# Patient Record
Sex: MALE | Race: WHITE | NOT HISPANIC OR LATINO | Employment: FULL TIME | URBAN - METROPOLITAN AREA
[De-identification: names, ages, dates, MRNs, and addresses within clinical notes are randomized per-mention and may not be internally consistent; named-entity substitution may affect disease eponyms.]

---

## 2019-12-09 ASSESSMENT — ENCOUNTER SYMPTOMS
SHORTNESS OF BREATH: 0
COUGH: 0
DIARRHEA: 0
PALPITATIONS: 0
HEMATURIA: 0
NAUSEA: 0
CHILLS: 0
HEARTBURN: 0
HEADACHES: 0
MYALGIAS: 0
NERVOUS/ANXIOUS: 0
ABDOMINAL PAIN: 0
CONSTIPATION: 0
ARTHRALGIAS: 0
DIZZINESS: 0
HEMATOCHEZIA: 0
WEAKNESS: 0
FREQUENCY: 0
EYE PAIN: 0
SORE THROAT: 0
JOINT SWELLING: 0
FEVER: 0
PARESTHESIAS: 0
DYSURIA: 0

## 2019-12-10 NOTE — PROGRESS NOTES
SUBJECTIVE:   CC: Darell Collazo is an 24 year old male who presents for preventative health visit.     Healthy Habits:    Getting at least 3 servings of Calcium per day:  Yes    Bi-annual eye exam:  Yes    Dental care twice a year:  NO    Sleep apnea or symptoms of sleep apnea:  None    Diet:  Regular (no restrictions)    Frequency of exercise:  2-3 days/week    Duration of exercise:  15-30 minutes    Taking medications regularly:  Not Applicable    Barriers to taking medications:  Not applicable    Medication side effects:  Not applicable    PHQ-2 Total Score:    Additional concerns today:  Yes              Today's PHQ-2 Score:   PHQ-2 ( 1999 Pfizer) 12/9/2019   Q1: Little interest or pleasure in doing things 0   Q2: Feeling down, depressed or hopeless 0   PHQ-2 Score 0   Q1: Little interest or pleasure in doing things Not at all   Q2: Feeling down, depressed or hopeless Not at all   PHQ-2 Score 0       Abuse: Current or Past(Physical, Sexual or Emotional)- No  Do you feel safe in your environment? Yes        Social History     Tobacco Use     Smoking status: Never Smoker     Smokeless tobacco: Never Used   Substance Use Topics     Alcohol use: No         Alcohol Use 12/9/2019   Prescreen: >3 drinks/day or >7 drinks/week? No         Reviewed orders with patient. Reviewed health maintenance and updated orders accordingly - Yes      Reviewed and updated as needed this visit by clinical staff  Tobacco  Allergies  Meds  Problems  Med Hx  Surg Hx  Fam Hx  Soc Hx          Reviewed and updated as needed this visit by Provider  Tobacco  Allergies  Meds  Problems  Med Hx  Surg Hx  Fam Hx            Review of Systems   Constitutional: Negative for chills and fever.   HENT: Negative for congestion, ear pain, hearing loss and sore throat.    Eyes: Negative for pain and visual disturbance.   Respiratory: Negative for cough and shortness of breath.    Cardiovascular: Negative for chest pain, palpitations and  "peripheral edema.   Gastrointestinal: Negative for abdominal pain, constipation, diarrhea, heartburn, hematochezia and nausea.   Genitourinary: Negative for discharge, dysuria, frequency, genital sores, hematuria, impotence and urgency.   Musculoskeletal: Negative for arthralgias, joint swelling and myalgias.   Skin: Negative for rash.   Neurological: Negative for dizziness, weakness, headaches and paresthesias.   Psychiatric/Behavioral: Negative for mood changes. The patient is not nervous/anxious.          OBJECTIVE:   /62   Pulse 74   Temp 98  F (36.7  C) (Temporal)   Resp 18   Ht 1.676 m (5' 6\")   Wt 77.6 kg (171 lb)   SpO2 98%   BMI 27.60 kg/m      Physical Exam  Constitutional:       General: He is not in acute distress.     Appearance: He is well-developed.   HENT:      Head: Normocephalic and atraumatic.      Right Ear: Hearing, tympanic membrane, ear canal and external ear normal. There is impacted cerumen.      Left Ear: Hearing, tympanic membrane, ear canal and external ear normal. There is impacted cerumen.      Ears:      Comments: Bilateral ear canal obstructed with cerumen.  This was removed by nursing with tap water irrigation.  Canal reexamined using otoscope and TM was within normal limits.        Nose: Nose normal.      Mouth/Throat:      Mouth: No oral lesions.      Pharynx: Uvula midline. No oropharyngeal exudate.   Eyes:      General: Lids are normal. No scleral icterus.        Right eye: No discharge.         Left eye: No discharge.      Conjunctiva/sclera: Conjunctivae normal.      Pupils: Pupils are equal, round, and reactive to light.   Neck:      Musculoskeletal: Normal range of motion and neck supple.      Thyroid: No thyroid mass or thyromegaly.      Trachea: No tracheal deviation.   Cardiovascular:      Rate and Rhythm: Normal rate and regular rhythm.      Pulses: Normal pulses.      Heart sounds: Normal heart sounds, S1 normal and S2 normal. No murmur. No S3 or S4 sounds.  " "  Pulmonary:      Effort: Pulmonary effort is normal. No respiratory distress.      Breath sounds: Normal breath sounds. No wheezing or rales.   Abdominal:      General: Bowel sounds are normal. There is no distension.      Palpations: Abdomen is soft. There is no mass.      Tenderness: There is no abdominal tenderness. There is no guarding.   Musculoskeletal: Normal range of motion.         General: No deformity.   Lymphadenopathy:      Cervical: No cervical adenopathy.      Upper Body:      Right upper body: No supraclavicular adenopathy.      Left upper body: No supraclavicular adenopathy.   Skin:     General: Skin is warm and dry.      Findings: No lesion or rash.   Neurological:      Mental Status: He is alert and oriented to person, place, and time.      Motor: No abnormal muscle tone.      Deep Tendon Reflexes: Reflexes are normal and symmetric.   Psychiatric:         Speech: Speech normal.         Thought Content: Thought content normal.         Judgment: Judgment normal.               ASSESSMENT/PLAN:     ASSESSMENT/ORDERS:    ICD-10-CM    1. Routine general medical examination at a health care facility Z00.00 Lipid panel reflex to direct LDL Fasting     PLAN:  1.  Discussed vaccine catch up today.  Will do MMR and varicella and Td and Hep A today.  2.  In 1 month or greater will have him come back for MMR and Varicella #2 and Hep B #2  3.  6 months from now, will get Td #3 and Hep B #3 and Hep A #2 (needs to be at least 2 months after round 2 noted above).    COUNSELING:   Reviewed preventive health counseling, as reflected in patient instructions    Estimated body mass index is 27.6 kg/m  as calculated from the following:    Height as of this encounter: 1.676 m (5' 6\").    Weight as of this encounter: 77.6 kg (171 lb).     Weight management plan: Discussed healthy diet and exercise guidelines     reports that he has never smoked. He has never used smokeless tobacco.      Counseling Resources:  ATP IV " Guidelines  Pooled Cohorts Equation Calculator  FRAX Risk Assessment  ICSI Preventive Guidelines  Dietary Guidelines for Americans, 2010  USDA's MyPlate  ASA Prophylaxis  Lung CA Screening    Gal Taveras MD  [facility]

## 2019-12-23 ENCOUNTER — OFFICE VISIT (OUTPATIENT)
Dept: FAMILY MEDICINE | Facility: CLINIC | Age: 24
End: 2019-12-23
Payer: COMMERCIAL

## 2019-12-23 VITALS
DIASTOLIC BLOOD PRESSURE: 62 MMHG | WEIGHT: 171 LBS | OXYGEN SATURATION: 98 % | TEMPERATURE: 98 F | HEART RATE: 74 BPM | RESPIRATION RATE: 18 BRPM | HEIGHT: 66 IN | SYSTOLIC BLOOD PRESSURE: 118 MMHG | BODY MASS INDEX: 27.48 KG/M2

## 2019-12-23 DIAGNOSIS — H61.23 BILATERAL IMPACTED CERUMEN: ICD-10-CM

## 2019-12-23 DIAGNOSIS — Z00.00 ROUTINE GENERAL MEDICAL EXAMINATION AT A HEALTH CARE FACILITY: Primary | ICD-10-CM

## 2019-12-23 PROCEDURE — 90472 IMMUNIZATION ADMIN EACH ADD: CPT | Performed by: FAMILY MEDICINE

## 2019-12-23 PROCEDURE — 99395 PREV VISIT EST AGE 18-39: CPT | Mod: 25 | Performed by: FAMILY MEDICINE

## 2019-12-23 PROCEDURE — 90632 HEPA VACCINE ADULT IM: CPT | Performed by: FAMILY MEDICINE

## 2019-12-23 PROCEDURE — 69209 REMOVE IMPACTED EAR WAX UNI: CPT | Mod: 50 | Performed by: FAMILY MEDICINE

## 2019-12-23 PROCEDURE — 90714 TD VACC NO PRESV 7 YRS+ IM: CPT | Performed by: FAMILY MEDICINE

## 2019-12-23 PROCEDURE — 90471 IMMUNIZATION ADMIN: CPT | Performed by: FAMILY MEDICINE

## 2019-12-23 PROCEDURE — 90716 VAR VACCINE LIVE SUBQ: CPT | Performed by: FAMILY MEDICINE

## 2019-12-23 PROCEDURE — 90707 MMR VACCINE SC: CPT | Performed by: FAMILY MEDICINE

## 2019-12-23 ASSESSMENT — PAIN SCALES - GENERAL: PAINLEVEL: NO PAIN (0)

## 2019-12-23 ASSESSMENT — MIFFLIN-ST. JEOR: SCORE: 1708.4

## 2019-12-23 NOTE — NURSING NOTE
Patient identified using two patient identifiers.  Ear exam showing wax occlusion completed by provider.  H202/H20 was placed in the bilateral ear(s) via irrigation tool: elephant ear.    Prior to immunization administration, verified patients identity using patient s name and date of birth. Please see Immunization Activity for additional information.     Screening Questionnaire for Adult Immunization    Are you sick today?   No   Do you have allergies to medications, food, a vaccine component or latex?   No   Have you ever had a serious reaction after receiving a vaccination?   No   Do you have a long-term health problem with heart, lung, kidney, or metabolic disease (e.g., diabetes), asthma, a blood disorder, no spleen, complement component deficiency, a cochlear implant, or a spinal fluid leak?  Are you on long-term aspirin therapy?   No   Do you have cancer, leukemia, HIV/AIDS, or any other immune system problem?   No   Do you have a parent, brother, or sister with an immune system problem?   No   In the past 3 months, have you taken medications that affect  your immune system, such as prednisone, other steroids, or anticancer drugs; drugs for the treatment of rheumatoid arthritis, Crohn s disease, or psoriasis; or have you had radiation treatments?   No   Have you had a seizure, or a brain or other nervous system problem?   No   During the past year, have you received a transfusion of blood or blood    products, or been given immune (gamma) globulin or antiviral drug?   No   For women: Are you pregnant or is there a chance you could become       pregnant during the next month?   No   Have you received any vaccinations in the past 4 weeks?   No     Immunization questionnaire answers were all negative.        Per orders of Dr. Taveras, injection of MMR, Varicella, Hep A, TD. given by Carissa Barrios CMA. Patient instructed to remain in clinic for 15 minutes afterwards, and to report any adverse reaction to me  immediately.       Screening performed by Carissa Barrios CMA on 12/23/2019 at 10:26 AM.

## 2020-01-27 ENCOUNTER — ALLIED HEALTH/NURSE VISIT (OUTPATIENT)
Dept: FAMILY MEDICINE | Facility: CLINIC | Age: 25
End: 2020-01-27
Payer: COMMERCIAL

## 2020-01-27 DIAGNOSIS — Z23 NEED FOR VACCINATION: Primary | ICD-10-CM

## 2020-01-27 PROCEDURE — 90472 IMMUNIZATION ADMIN EACH ADD: CPT

## 2020-01-27 PROCEDURE — 90707 MMR VACCINE SC: CPT

## 2020-01-27 PROCEDURE — 90471 IMMUNIZATION ADMIN: CPT

## 2020-01-27 PROCEDURE — 99207 ZZC NO CHARGE NURSE ONLY: CPT

## 2020-01-27 PROCEDURE — 90716 VAR VACCINE LIVE SUBQ: CPT

## 2020-01-27 PROCEDURE — 90746 HEPB VACCINE 3 DOSE ADULT IM: CPT

## 2020-12-20 ENCOUNTER — HEALTH MAINTENANCE LETTER (OUTPATIENT)
Age: 25
End: 2020-12-20

## 2021-02-28 ENCOUNTER — HEALTH MAINTENANCE LETTER (OUTPATIENT)
Age: 26
End: 2021-02-28

## 2021-08-10 ENCOUNTER — OFFICE VISIT (OUTPATIENT)
Dept: FAMILY MEDICINE | Facility: CLINIC | Age: 26
End: 2021-08-10
Payer: COMMERCIAL

## 2021-08-10 VITALS
RESPIRATION RATE: 16 BRPM | HEIGHT: 68 IN | BODY MASS INDEX: 24.86 KG/M2 | SYSTOLIC BLOOD PRESSURE: 124 MMHG | DIASTOLIC BLOOD PRESSURE: 70 MMHG | WEIGHT: 164 LBS | OXYGEN SATURATION: 98 % | TEMPERATURE: 98 F | HEART RATE: 80 BPM

## 2021-08-10 DIAGNOSIS — Z30.09 ENCOUNTER FOR VASECTOMY COUNSELING: Primary | ICD-10-CM

## 2021-08-10 PROCEDURE — 99213 OFFICE O/P EST LOW 20 MIN: CPT | Performed by: FAMILY MEDICINE

## 2021-08-10 RX ORDER — DIAZEPAM 5 MG
TABLET ORAL
Qty: 2 TABLET | Refills: 0 | Status: SHIPPED | OUTPATIENT
Start: 2021-08-10 | End: 2021-08-10

## 2021-08-10 RX ORDER — DIAZEPAM 5 MG
TABLET ORAL
Qty: 2 TABLET | Refills: 0 | Status: SHIPPED | OUTPATIENT
Start: 2021-08-10 | End: 2022-09-29

## 2021-08-10 ASSESSMENT — MIFFLIN-ST. JEOR: SCORE: 1692.65

## 2021-08-10 ASSESSMENT — PAIN SCALES - GENERAL: PAINLEVEL: NO PAIN (0)

## 2021-08-10 NOTE — PROGRESS NOTES
"SUBJECTIVE:  This 26 year old male is in for a vasectomy consultation.  He and his partner have decided they don't want to have any more children. They have decided that he will have the sterilization procedure instead of her.  He understands that this is a permanent procedure.  Reversal could be an option, but has a less than 25% success rate.  Patient was given information to read prior to the consultation.      We talked about the risks and benefits of the vasectomy; risks being that of potential for bleeding, infection, postoperative discomfort immediately which can last for a number of weeks afterwards, also the development of spermatoceles or sperm granulomas, epididymal cysts and the possibility of failure of the procedure producing failed attempt at sterility.     Also mentioned to the patient that there is some literature out there that suggests men who have vasectomies are at increased risk for prostate cancer; however, this literature is inconclusive and controversial.  It is recommended that men who have vasectomies should discuss appropriate prostate cancer screening with their regular provider.     I went through the procedure with the patient, how it is done, a midline incision in the scrotum measuring approximately 1/2 inch in length.  The vas deferens is brought up through this incision, dissected free and cut in two.  Each open end is cauterized and then the proximal or distal end is buried away from the other.  Patient had no questions when it came to the procedure itself.  I did show him pictures and diagrams of the procedure.  I showed him where a potential spermatocele or sperm granuloma can occur.      Again, the patient had no further questions for me.    OBJECTIVE:  /70   Pulse 80   Temp 98  F (36.7  C) (Temporal)   Resp 16   Ht 1.718 m (5' 7.64\")   Wt 74.4 kg (164 lb)   SpO2 98%   BMI 25.20 kg/m    On exam, this is a well-developed, well-nourished white male.    Exam reveals a " normal circumcised penis, no lesions.  Testes are descended bilaterally.  Exam was limited to the genitalia.  Both vas deferens were easily identified.  No other abnormalities were noted.      ASSESSMENT:  Undesired fertility in an adult male, requesting vasectomy.    PLAN:  He will schedule a vasectomy at his convenience for either the last appointment of the morning or on a Thursday or Friday afternoon.  He is aware that he will need to take the entire weekend off and have essentially bedrest for two days with ice packs every two hours and ibuprofen for discomfort.  I offeredhim a prescription for ibuprofen 800 mg to take every eight hours with food after the procedure, but he declined the prescription as he prefers to use the OTC equivalent.  I gave him a prescription for Valium two 5 mg tablets.  He will take 1 tablet upon arrival to clinic and will have the second tablet available to take as needed.  He understands that he will need a  after the procedure due to taking this type of medication.    If he has any further questions, he will contact me prior to the procedure or ask me on the day of the procedure.  He also is aware that he will need to bring a specimen after 12 weeks to make sure that he has cleared the storage vesicles of any residual sperm and to make sure that he is sterile.  He understands that until this is done, he and his partner should continue their regular method of contraception.  I also recommended that he submit a repeat specimen 1 year after the procedure to document continued sterility.      Gal Taveras MD

## 2021-09-10 ENCOUNTER — OFFICE VISIT (OUTPATIENT)
Dept: FAMILY MEDICINE | Facility: CLINIC | Age: 26
End: 2021-09-10
Payer: COMMERCIAL

## 2021-09-10 ENCOUNTER — APPOINTMENT (OUTPATIENT)
Dept: FAMILY MEDICINE | Facility: CLINIC | Age: 26
End: 2021-09-10
Payer: COMMERCIAL

## 2021-09-10 DIAGNOSIS — Z30.2 ENCOUNTER FOR VASECTOMY: Primary | ICD-10-CM

## 2021-09-10 PROCEDURE — 55250 REMOVAL OF SPERM DUCT(S): CPT | Performed by: FAMILY MEDICINE

## 2021-09-10 NOTE — PROGRESS NOTES
SUBJECTIVE:  Darell Collazo is a 26 year old male who comes in today for vasectomy.  The patient (and his significant other) have previously been in and we discussed the other options for birth control, the risks and benefits of the procedure.  Details of those risks and benefits have been noted on the consent form which has been signed. Prostate cancer data was also reviewed.  Patient took 10 mg of valium prior to procedure.  All questions have been answered.    Procedure:  In the supine position,the patient was sterilely prepped and draped in the usual fashion.  The left vas was first identified and brought up to the midline raphae where a small wheal of Lidocaine with epinephrine was placed in the skin overlying the vas and further anesthesia injected in to the vas sheath.  The no-scalpel vas clamp was then used to grasp the vas and the no-scalpel dissecting instrument was then used to puncture the skin and dissect out the vas free of adventitial tissue.  Skin bleeders were cauterized with thermocautery as necessary.  When a segment of vas was clearly freed up, the vas clamped and ligated with both open open ends cauterized directly inside the lumen.  The distal end was then buried with pursestring suture.  Any bleeders were then cauterized and/or ligated with 4-0 Vicryl.  When the sterile field was completely dry, it was returned to the scrotal sac.  The same procedure was done on the right side. Bacitracin dressing was placed over open wound.      ASSESSMENT:    Male sterilization via vasectomy without complication.    PLAN:   Post vas instruction sheet is reviewed and given along with a specimen container.  Patient instructed to rest over the next couple of days, apply ice and use 600-800mg of ibuprofen tid.  Patient is not considered sterile until the post vas specimen verifies aspermia.  One year followup also recommended.  To call for any problems as outlined and activity as outlined in the sheet  given.    Gal Taveras MD

## 2021-10-03 ENCOUNTER — HEALTH MAINTENANCE LETTER (OUTPATIENT)
Age: 26
End: 2021-10-03

## 2021-12-14 ENCOUNTER — LAB (OUTPATIENT)
Dept: LAB | Facility: CLINIC | Age: 26
End: 2021-12-14
Payer: COMMERCIAL

## 2021-12-14 DIAGNOSIS — Z30.2 ENCOUNTER FOR VASECTOMY: ICD-10-CM

## 2021-12-14 LAB — SPERM P VAS SMN QL MICRO: NORMAL

## 2021-12-14 PROCEDURE — 89321 SEMEN ANAL SPERM DETECTION: CPT

## 2021-12-14 NOTE — RESULT ENCOUNTER NOTE
Da,  Your results show you had a successful vasectomy.  I recommend follow-up specimen in 1 year.  Please let me know if you have any questions.    Sincerely,  Dr. Taveras

## 2022-03-19 ENCOUNTER — HEALTH MAINTENANCE LETTER (OUTPATIENT)
Age: 27
End: 2022-03-19

## 2022-09-04 ENCOUNTER — HEALTH MAINTENANCE LETTER (OUTPATIENT)
Age: 27
End: 2022-09-04

## 2022-09-29 ENCOUNTER — OFFICE VISIT (OUTPATIENT)
Dept: FAMILY MEDICINE | Facility: CLINIC | Age: 27
End: 2022-09-29
Payer: COMMERCIAL

## 2022-09-29 VITALS
TEMPERATURE: 98.4 F | BODY MASS INDEX: 26.87 KG/M2 | RESPIRATION RATE: 14 BRPM | SYSTOLIC BLOOD PRESSURE: 108 MMHG | WEIGHT: 171.2 LBS | HEART RATE: 76 BPM | DIASTOLIC BLOOD PRESSURE: 72 MMHG | OXYGEN SATURATION: 100 % | HEIGHT: 67 IN

## 2022-09-29 DIAGNOSIS — H61.23 BILATERAL IMPACTED CERUMEN: ICD-10-CM

## 2022-09-29 DIAGNOSIS — Z00.00 ROUTINE GENERAL MEDICAL EXAMINATION AT A HEALTH CARE FACILITY: Primary | ICD-10-CM

## 2022-09-29 DIAGNOSIS — Z11.59 NEED FOR HEPATITIS C SCREENING TEST: ICD-10-CM

## 2022-09-29 DIAGNOSIS — Z13.220 SCREENING FOR HYPERLIPIDEMIA: ICD-10-CM

## 2022-09-29 DIAGNOSIS — Z13.1 SCREENING FOR DIABETES MELLITUS: ICD-10-CM

## 2022-09-29 PROCEDURE — 69209 REMOVE IMPACTED EAR WAX UNI: CPT | Mod: 50 | Performed by: STUDENT IN AN ORGANIZED HEALTH CARE EDUCATION/TRAINING PROGRAM

## 2022-09-29 PROCEDURE — 90471 IMMUNIZATION ADMIN: CPT | Performed by: STUDENT IN AN ORGANIZED HEALTH CARE EDUCATION/TRAINING PROGRAM

## 2022-09-29 PROCEDURE — 90686 IIV4 VACC NO PRSV 0.5 ML IM: CPT | Performed by: STUDENT IN AN ORGANIZED HEALTH CARE EDUCATION/TRAINING PROGRAM

## 2022-09-29 PROCEDURE — 99395 PREV VISIT EST AGE 18-39: CPT | Mod: 25 | Performed by: STUDENT IN AN ORGANIZED HEALTH CARE EDUCATION/TRAINING PROGRAM

## 2022-09-29 ASSESSMENT — ENCOUNTER SYMPTOMS
DIARRHEA: 0
SORE THROAT: 0
HEADACHES: 0
JOINT SWELLING: 0
SHORTNESS OF BREATH: 0
NAUSEA: 0
EYE PAIN: 0
ARTHRALGIAS: 0
WEAKNESS: 0
HEMATURIA: 0
HEARTBURN: 0
HEMATOCHEZIA: 0
NERVOUS/ANXIOUS: 0
PARESTHESIAS: 0
MYALGIAS: 0
COUGH: 0
DIZZINESS: 0
DYSURIA: 0
CHILLS: 0
PALPITATIONS: 0
ABDOMINAL PAIN: 0
CONSTIPATION: 0
FREQUENCY: 0
FEVER: 0

## 2022-09-29 ASSESSMENT — PAIN SCALES - GENERAL: PAINLEVEL: NO PAIN (0)
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
## 2023-05-01 ENCOUNTER — APPOINTMENT (OUTPATIENT)
Dept: CT IMAGING | Facility: CLINIC | Age: 28
End: 2023-05-01
Payer: COMMERCIAL

## 2023-05-01 ENCOUNTER — ANESTHESIA EVENT (OUTPATIENT)
Dept: SURGERY | Facility: CLINIC | Age: 28
End: 2023-05-01
Payer: COMMERCIAL

## 2023-05-01 ENCOUNTER — HOSPITAL ENCOUNTER (INPATIENT)
Facility: CLINIC | Age: 28
LOS: 2 days | Discharge: HOME | End: 2023-05-03
Attending: EMERGENCY MEDICINE | Admitting: INTERNAL MEDICINE
Payer: COMMERCIAL

## 2023-05-01 ENCOUNTER — HOSPITAL ENCOUNTER (INPATIENT)
Facility: CLINIC | Age: 28
Setting detail: SURGERY ADMIT
End: 2023-05-01
Attending: SURGERY | Admitting: SURGERY
Payer: COMMERCIAL

## 2023-05-01 ENCOUNTER — ANESTHESIA (OUTPATIENT)
Dept: SURGERY | Facility: CLINIC | Age: 28
End: 2023-05-01
Payer: COMMERCIAL

## 2023-05-01 DIAGNOSIS — K35.32 ACUTE APPENDICITIS WITH PERFORATION AND LOCALIZED PERITONITIS, WITHOUT ABSCESS, UNSPECIFIED WHETHER GANGRENE PRESENT: Primary | ICD-10-CM

## 2023-05-01 LAB
ALBUMIN SERPL BCG-MCNC: 4.4 G/DL (ref 3.5–5.2)
ALP SERPL-CCNC: 91 U/L (ref 40–129)
ALT SERPL W P-5'-P-CCNC: 17 U/L (ref 10–50)
ANION GAP SERPL CALCULATED.3IONS-SCNC: 20 MMOL/L (ref 7–15)
AST SERPL W P-5'-P-CCNC: 19 U/L (ref 10–50)
BASOPHILS # BLD AUTO: 0.1 10E3/UL (ref 0–0.2)
BASOPHILS NFR BLD AUTO: 0 %
BILIRUB DIRECT SERPL-MCNC: 0.36 MG/DL (ref 0–0.3)
BILIRUB SERPL-MCNC: 1.8 MG/DL
BUN SERPL-MCNC: 11.4 MG/DL (ref 6–20)
CALCIUM SERPL-MCNC: 9.3 MG/DL (ref 8.6–10)
CHLORIDE SERPL-SCNC: 96 MMOL/L (ref 98–107)
CREAT SERPL-MCNC: 0.96 MG/DL (ref 0.67–1.17)
CRP SERPL-MCNC: 118.32 MG/L
DEPRECATED HCO3 PLAS-SCNC: 19 MMOL/L (ref 22–29)
EOSINOPHIL # BLD AUTO: 0.1 10E3/UL (ref 0–0.7)
EOSINOPHIL NFR BLD AUTO: 0 %
ERYTHROCYTE [DISTWIDTH] IN BLOOD BY AUTOMATED COUNT: 17.9 % (ref 10–15)
ERYTHROCYTE [SEDIMENTATION RATE] IN BLOOD BY WESTERGREN METHOD: 18 MM/HR (ref 0–15)
GFR SERPL CREATININE-BSD FRML MDRD: >90 ML/MIN/1.73M2
GLUCOSE SERPL-MCNC: 124 MG/DL (ref 70–99)
HCT VFR BLD AUTO: 40.4 % (ref 40–53)
HGB BLD-MCNC: 12.9 G/DL (ref 13.3–17.7)
HOLD SPECIMEN: NORMAL
HOLD SPECIMEN: NORMAL
IMM GRANULOCYTES # BLD: 0.1 10E3/UL
IMM GRANULOCYTES NFR BLD: 1 %
LIPASE SERPL-CCNC: 24 U/L (ref 13–60)
LYMPHOCYTES # BLD AUTO: 1.7 10E3/UL (ref 0.8–5.3)
LYMPHOCYTES NFR BLD AUTO: 12 %
MCH RBC QN AUTO: 19.3 PG (ref 26.5–33)
MCHC RBC AUTO-ENTMCNC: 31.9 G/DL (ref 31.5–36.5)
MCV RBC AUTO: 60 FL (ref 78–100)
MONOCYTES # BLD AUTO: 1.2 10E3/UL (ref 0–1.3)
MONOCYTES NFR BLD AUTO: 9 %
NEUTROPHILS # BLD AUTO: 11.1 10E3/UL (ref 1.6–8.3)
NEUTROPHILS NFR BLD AUTO: 78 %
NRBC # BLD AUTO: 0 10E3/UL
NRBC BLD AUTO-RTO: 0 /100
PLATELET # BLD AUTO: 238 10E3/UL (ref 150–450)
POTASSIUM SERPL-SCNC: 3.6 MMOL/L (ref 3.4–5.3)
PROT SERPL-MCNC: 7.9 G/DL (ref 6.4–8.3)
RBC # BLD AUTO: 6.7 10E6/UL (ref 4.4–5.9)
SODIUM SERPL-SCNC: 135 MMOL/L (ref 136–145)
WBC # BLD AUTO: 14.2 10E3/UL (ref 4–11)

## 2023-05-01 PROCEDURE — 271N000001 HC OR GENERAL SUPPLY NON-STERILE: Performed by: SURGERY

## 2023-05-01 PROCEDURE — 88304 TISSUE EXAM BY PATHOLOGIST: CPT | Mod: 26 | Performed by: PATHOLOGY

## 2023-05-01 PROCEDURE — 99221 1ST HOSP IP/OBS SF/LOW 40: CPT | Mod: 57 | Performed by: SURGERY

## 2023-05-01 PROCEDURE — 250N000009 HC RX 250: Performed by: NURSE ANESTHETIST, CERTIFIED REGISTERED

## 2023-05-01 PROCEDURE — 250N000011 HC RX IP 250 OP 636: Performed by: EMERGENCY MEDICINE

## 2023-05-01 PROCEDURE — 80053 COMPREHEN METABOLIC PANEL: CPT

## 2023-05-01 PROCEDURE — 96376 TX/PRO/DX INJ SAME DRUG ADON: CPT | Performed by: EMERGENCY MEDICINE

## 2023-05-01 PROCEDURE — 99285 EMERGENCY DEPT VISIT HI MDM: CPT | Mod: 25 | Performed by: EMERGENCY MEDICINE

## 2023-05-01 PROCEDURE — 258N000003 HC RX IP 258 OP 636: Performed by: INTERNAL MEDICINE

## 2023-05-01 PROCEDURE — 360N000076 HC SURGERY LEVEL 3, PER MIN: Performed by: SURGERY

## 2023-05-01 PROCEDURE — 99285 EMERGENCY DEPT VISIT HI MDM: CPT | Performed by: EMERGENCY MEDICINE

## 2023-05-01 PROCEDURE — 44970 LAPAROSCOPY APPENDECTOMY: CPT | Performed by: SURGERY

## 2023-05-01 PROCEDURE — 88304 TISSUE EXAM BY PATHOLOGIST: CPT | Mod: TC | Performed by: SURGERY

## 2023-05-01 PROCEDURE — 250N000009 HC RX 250: Performed by: SURGERY

## 2023-05-01 PROCEDURE — 83690 ASSAY OF LIPASE: CPT

## 2023-05-01 PROCEDURE — 96361 HYDRATE IV INFUSION ADD-ON: CPT | Performed by: EMERGENCY MEDICINE

## 2023-05-01 PROCEDURE — 82248 BILIRUBIN DIRECT: CPT | Performed by: EMERGENCY MEDICINE

## 2023-05-01 PROCEDURE — 86140 C-REACTIVE PROTEIN: CPT | Performed by: EMERGENCY MEDICINE

## 2023-05-01 PROCEDURE — 370N000017 HC ANESTHESIA TECHNICAL FEE, PER MIN: Performed by: SURGERY

## 2023-05-01 PROCEDURE — 0DTJ4ZZ RESECTION OF APPENDIX, PERCUTANEOUS ENDOSCOPIC APPROACH: ICD-10-PCS | Performed by: SURGERY

## 2023-05-01 PROCEDURE — 250N000009 HC RX 250: Performed by: EMERGENCY MEDICINE

## 2023-05-01 PROCEDURE — 258N000003 HC RX IP 258 OP 636

## 2023-05-01 PROCEDURE — 250N000011 HC RX IP 250 OP 636: Performed by: SURGERY

## 2023-05-01 PROCEDURE — 258N000003 HC RX IP 258 OP 636: Performed by: NURSE ANESTHETIST, CERTIFIED REGISTERED

## 2023-05-01 PROCEDURE — 0DBH4ZZ EXCISION OF CECUM, PERCUTANEOUS ENDOSCOPIC APPROACH: ICD-10-PCS | Performed by: SURGERY

## 2023-05-01 PROCEDURE — 250N000011 HC RX IP 250 OP 636: Performed by: NURSE ANESTHETIST, CERTIFIED REGISTERED

## 2023-05-01 PROCEDURE — 96374 THER/PROPH/DIAG INJ IV PUSH: CPT | Mod: 59 | Performed by: EMERGENCY MEDICINE

## 2023-05-01 PROCEDURE — 250N000013 HC RX MED GY IP 250 OP 250 PS 637: Performed by: NURSE ANESTHETIST, CERTIFIED REGISTERED

## 2023-05-01 PROCEDURE — 250N000011 HC RX IP 250 OP 636

## 2023-05-01 PROCEDURE — 120N000001 HC R&B MED SURG/OB

## 2023-05-01 PROCEDURE — 74177 CT ABD & PELVIS W/CONTRAST: CPT

## 2023-05-01 PROCEDURE — 85652 RBC SED RATE AUTOMATED: CPT | Performed by: EMERGENCY MEDICINE

## 2023-05-01 PROCEDURE — 710N000010 HC RECOVERY PHASE 1, LEVEL 2, PER MIN: Performed by: SURGERY

## 2023-05-01 PROCEDURE — 36415 COLL VENOUS BLD VENIPUNCTURE: CPT

## 2023-05-01 PROCEDURE — 85025 COMPLETE CBC W/AUTO DIFF WBC: CPT

## 2023-05-01 PROCEDURE — 272N000001 HC OR GENERAL SUPPLY STERILE: Performed by: SURGERY

## 2023-05-01 PROCEDURE — 250N000026 HC DESFLURANE, PER MIN: Performed by: SURGERY

## 2023-05-01 RX ORDER — BUPIVACAINE HYDROCHLORIDE AND EPINEPHRINE 2.5; 5 MG/ML; UG/ML
INJECTION, SOLUTION INFILTRATION; PERINEURAL PRN
Status: DISCONTINUED | OUTPATIENT
Start: 2023-05-01 | End: 2023-05-01 | Stop reason: HOSPADM

## 2023-05-01 RX ORDER — ACETAMINOPHEN 325 MG/1
975 TABLET ORAL ONCE
Status: COMPLETED | OUTPATIENT
Start: 2023-05-01 | End: 2023-05-01

## 2023-05-01 RX ORDER — HYDROMORPHONE HYDROCHLORIDE 1 MG/ML
0.4 INJECTION, SOLUTION INTRAMUSCULAR; INTRAVENOUS; SUBCUTANEOUS EVERY 5 MIN PRN
Status: DISCONTINUED | OUTPATIENT
Start: 2023-05-01 | End: 2023-05-01 | Stop reason: HOSPADM

## 2023-05-01 RX ORDER — PROPOFOL 10 MG/ML
INJECTION, EMULSION INTRAVENOUS PRN
Status: DISCONTINUED | OUTPATIENT
Start: 2023-05-01 | End: 2023-05-01

## 2023-05-01 RX ORDER — NALOXONE HYDROCHLORIDE 0.4 MG/ML
0.2 INJECTION, SOLUTION INTRAMUSCULAR; INTRAVENOUS; SUBCUTANEOUS
Status: DISCONTINUED | OUTPATIENT
Start: 2023-05-01 | End: 2023-05-03 | Stop reason: HOSPADM

## 2023-05-01 RX ORDER — AMPICILLIN AND SULBACTAM 2; 1 G/1; G/1
3 INJECTION, POWDER, FOR SOLUTION INTRAMUSCULAR; INTRAVENOUS EVERY 6 HOURS
Status: DISCONTINUED | OUTPATIENT
Start: 2023-05-02 | End: 2023-05-03

## 2023-05-01 RX ORDER — NALOXONE HYDROCHLORIDE 0.4 MG/ML
0.4 INJECTION, SOLUTION INTRAMUSCULAR; INTRAVENOUS; SUBCUTANEOUS
Status: DISCONTINUED | OUTPATIENT
Start: 2023-05-01 | End: 2023-05-03 | Stop reason: HOSPADM

## 2023-05-01 RX ORDER — HYDROCODONE BITARTRATE AND ACETAMINOPHEN 5; 325 MG/1; MG/1
1 TABLET ORAL EVERY 6 HOURS PRN
Status: DISCONTINUED | OUTPATIENT
Start: 2023-05-01 | End: 2023-05-03 | Stop reason: HOSPADM

## 2023-05-01 RX ORDER — FENTANYL CITRATE 50 UG/ML
25 INJECTION, SOLUTION INTRAMUSCULAR; INTRAVENOUS EVERY 5 MIN PRN
Status: DISCONTINUED | OUTPATIENT
Start: 2023-05-01 | End: 2023-05-01 | Stop reason: HOSPADM

## 2023-05-01 RX ORDER — ACETAMINOPHEN 325 MG/1
650 TABLET ORAL EVERY 6 HOURS PRN
Status: DISCONTINUED | OUTPATIENT
Start: 2023-05-01 | End: 2023-05-03

## 2023-05-01 RX ORDER — KETAMINE HYDROCHLORIDE 10 MG/ML
INJECTION INTRAMUSCULAR; INTRAVENOUS PRN
Status: DISCONTINUED | OUTPATIENT
Start: 2023-05-01 | End: 2023-05-01

## 2023-05-01 RX ORDER — CEFAZOLIN SODIUM/WATER 2 G/20 ML
2 SYRINGE (ML) INTRAVENOUS SEE ADMIN INSTRUCTIONS
Status: CANCELLED | OUTPATIENT
Start: 2023-05-01

## 2023-05-01 RX ORDER — AMPICILLIN AND SULBACTAM 2; 1 G/1; G/1
3 INJECTION, POWDER, FOR SOLUTION INTRAMUSCULAR; INTRAVENOUS ONCE
Status: COMPLETED | OUTPATIENT
Start: 2023-05-01 | End: 2023-05-01

## 2023-05-01 RX ORDER — ENOXAPARIN SODIUM 100 MG/ML
40 INJECTION SUBCUTANEOUS EVERY 24 HOURS
Status: DISCONTINUED | OUTPATIENT
Start: 2023-05-02 | End: 2023-05-02

## 2023-05-01 RX ORDER — CEFAZOLIN SODIUM/WATER 2 G/20 ML
2 SYRINGE (ML) INTRAVENOUS
Status: CANCELLED | OUTPATIENT
Start: 2023-05-01

## 2023-05-01 RX ORDER — DEXAMETHASONE SODIUM PHOSPHATE 10 MG/ML
INJECTION, SOLUTION INTRAMUSCULAR; INTRAVENOUS PRN
Status: DISCONTINUED | OUTPATIENT
Start: 2023-05-01 | End: 2023-05-01

## 2023-05-01 RX ORDER — HYDROXYZINE HYDROCHLORIDE 25 MG/1
25 TABLET, FILM COATED ORAL EVERY 6 HOURS PRN
Status: DISCONTINUED | OUTPATIENT
Start: 2023-05-01 | End: 2023-05-01 | Stop reason: HOSPADM

## 2023-05-01 RX ORDER — SODIUM CHLORIDE, SODIUM LACTATE, POTASSIUM CHLORIDE, CALCIUM CHLORIDE 600; 310; 30; 20 MG/100ML; MG/100ML; MG/100ML; MG/100ML
INJECTION, SOLUTION INTRAVENOUS CONTINUOUS PRN
Status: DISCONTINUED | OUTPATIENT
Start: 2023-05-01 | End: 2023-05-01

## 2023-05-01 RX ORDER — FENTANYL CITRATE 50 UG/ML
50 INJECTION, SOLUTION INTRAMUSCULAR; INTRAVENOUS EVERY 5 MIN PRN
Status: DISCONTINUED | OUTPATIENT
Start: 2023-05-01 | End: 2023-05-01 | Stop reason: HOSPADM

## 2023-05-01 RX ORDER — ONDANSETRON 2 MG/ML
4 INJECTION INTRAMUSCULAR; INTRAVENOUS EVERY 30 MIN PRN
Status: DISCONTINUED | OUTPATIENT
Start: 2023-05-01 | End: 2023-05-01 | Stop reason: HOSPADM

## 2023-05-01 RX ORDER — ONDANSETRON 2 MG/ML
INJECTION INTRAMUSCULAR; INTRAVENOUS PRN
Status: DISCONTINUED | OUTPATIENT
Start: 2023-05-01 | End: 2023-05-01

## 2023-05-01 RX ORDER — LIDOCAINE HYDROCHLORIDE 10 MG/ML
INJECTION, SOLUTION INFILTRATION; PERINEURAL PRN
Status: DISCONTINUED | OUTPATIENT
Start: 2023-05-01 | End: 2023-05-01

## 2023-05-01 RX ORDER — FENTANYL CITRATE 50 UG/ML
INJECTION, SOLUTION INTRAMUSCULAR; INTRAVENOUS PRN
Status: DISCONTINUED | OUTPATIENT
Start: 2023-05-01 | End: 2023-05-01

## 2023-05-01 RX ORDER — HYDROMORPHONE HYDROCHLORIDE 1 MG/ML
0.2 INJECTION, SOLUTION INTRAMUSCULAR; INTRAVENOUS; SUBCUTANEOUS EVERY 5 MIN PRN
Status: DISCONTINUED | OUTPATIENT
Start: 2023-05-01 | End: 2023-05-01 | Stop reason: HOSPADM

## 2023-05-01 RX ORDER — SODIUM CHLORIDE 9 MG/ML
INJECTION, SOLUTION INTRAVENOUS CONTINUOUS
Status: DISCONTINUED | OUTPATIENT
Start: 2023-05-01 | End: 2023-05-03 | Stop reason: HOSPADM

## 2023-05-01 RX ORDER — KETOROLAC TROMETHAMINE 30 MG/ML
INJECTION, SOLUTION INTRAMUSCULAR; INTRAVENOUS PRN
Status: DISCONTINUED | OUTPATIENT
Start: 2023-05-01 | End: 2023-05-01

## 2023-05-01 RX ORDER — HALOPERIDOL 5 MG/ML
1 INJECTION INTRAMUSCULAR
Status: DISCONTINUED | OUTPATIENT
Start: 2023-05-01 | End: 2023-05-01 | Stop reason: HOSPADM

## 2023-05-01 RX ORDER — ONDANSETRON 2 MG/ML
4 INJECTION INTRAMUSCULAR; INTRAVENOUS EVERY 30 MIN PRN
Status: DISCONTINUED | OUTPATIENT
Start: 2023-05-01 | End: 2023-05-03

## 2023-05-01 RX ORDER — IOPAMIDOL 755 MG/ML
500 INJECTION, SOLUTION INTRAVASCULAR ONCE
Status: COMPLETED | OUTPATIENT
Start: 2023-05-01 | End: 2023-05-01

## 2023-05-01 RX ORDER — HYDROMORPHONE HYDROCHLORIDE 1 MG/ML
0.5 INJECTION, SOLUTION INTRAMUSCULAR; INTRAVENOUS; SUBCUTANEOUS
Status: DISCONTINUED | OUTPATIENT
Start: 2023-05-01 | End: 2023-05-03

## 2023-05-01 RX ORDER — SODIUM CHLORIDE, SODIUM LACTATE, POTASSIUM CHLORIDE, CALCIUM CHLORIDE 600; 310; 30; 20 MG/100ML; MG/100ML; MG/100ML; MG/100ML
INJECTION, SOLUTION INTRAVENOUS CONTINUOUS
Status: DISCONTINUED | OUTPATIENT
Start: 2023-05-01 | End: 2023-05-01 | Stop reason: HOSPADM

## 2023-05-01 RX ORDER — PROPOFOL 10 MG/ML
INJECTION, EMULSION INTRAVENOUS CONTINUOUS PRN
Status: DISCONTINUED | OUTPATIENT
Start: 2023-05-01 | End: 2023-05-01

## 2023-05-01 RX ORDER — ONDANSETRON 4 MG/1
4 TABLET, ORALLY DISINTEGRATING ORAL EVERY 30 MIN PRN
Status: DISCONTINUED | OUTPATIENT
Start: 2023-05-01 | End: 2023-05-01 | Stop reason: HOSPADM

## 2023-05-01 RX ORDER — SODIUM CHLORIDE 9 MG/ML
INJECTION, SOLUTION INTRAVENOUS CONTINUOUS
Status: DISCONTINUED | OUTPATIENT
Start: 2023-05-01 | End: 2023-05-01

## 2023-05-01 RX ADMIN — LIDOCAINE HYDROCHLORIDE 50 MG: 10 INJECTION, SOLUTION INFILTRATION; PERINEURAL at 19:40

## 2023-05-01 RX ADMIN — DEXMEDETOMIDINE HYDROCHLORIDE 10 MCG: 100 INJECTION, SOLUTION INTRAVENOUS at 20:03

## 2023-05-01 RX ADMIN — SODIUM CHLORIDE 60 ML: 9 INJECTION, SOLUTION INTRAVENOUS at 16:46

## 2023-05-01 RX ADMIN — ACETAMINOPHEN 975 MG: 325 TABLET ORAL at 21:37

## 2023-05-01 RX ADMIN — HYDROMORPHONE HYDROCHLORIDE 0.5 MG: 1 INJECTION, SOLUTION INTRAMUSCULAR; INTRAVENOUS; SUBCUTANEOUS at 18:12

## 2023-05-01 RX ADMIN — Medication 20 MG: at 19:59

## 2023-05-01 RX ADMIN — AMPICILLIN SODIUM AND SULBACTAM SODIUM 3 G: 2; 1 INJECTION, POWDER, FOR SOLUTION INTRAMUSCULAR; INTRAVENOUS at 17:39

## 2023-05-01 RX ADMIN — ROCURONIUM BROMIDE 50 MG: 50 INJECTION, SOLUTION INTRAVENOUS at 19:40

## 2023-05-01 RX ADMIN — FENTANYL CITRATE 50 MCG: 50 INJECTION, SOLUTION INTRAMUSCULAR; INTRAVENOUS at 20:11

## 2023-05-01 RX ADMIN — SODIUM CHLORIDE 1000 ML: 9 INJECTION, SOLUTION INTRAVENOUS at 15:50

## 2023-05-01 RX ADMIN — FENTANYL CITRATE 50 MCG: 50 INJECTION, SOLUTION INTRAMUSCULAR; INTRAVENOUS at 19:40

## 2023-05-01 RX ADMIN — HYDROMORPHONE HYDROCHLORIDE 0.5 MG: 1 INJECTION, SOLUTION INTRAMUSCULAR; INTRAVENOUS; SUBCUTANEOUS at 16:00

## 2023-05-01 RX ADMIN — IOPAMIDOL 80 ML: 755 INJECTION, SOLUTION INTRAVENOUS at 16:47

## 2023-05-01 RX ADMIN — DEXAMETHASONE SODIUM PHOSPHATE 10 MG: 10 INJECTION, SOLUTION INTRAMUSCULAR; INTRAVENOUS at 19:59

## 2023-05-01 RX ADMIN — SODIUM CHLORIDE, POTASSIUM CHLORIDE, SODIUM LACTATE AND CALCIUM CHLORIDE: 600; 310; 30; 20 INJECTION, SOLUTION INTRAVENOUS at 19:32

## 2023-05-01 RX ADMIN — SUGAMMADEX 200 MG: 100 INJECTION, SOLUTION INTRAVENOUS at 20:54

## 2023-05-01 RX ADMIN — PROPOFOL 200 MG: 10 INJECTION, EMULSION INTRAVENOUS at 19:40

## 2023-05-01 RX ADMIN — SODIUM CHLORIDE: 9 INJECTION, SOLUTION INTRAVENOUS at 23:19

## 2023-05-01 RX ADMIN — MIDAZOLAM 2 MG: 1 INJECTION INTRAMUSCULAR; INTRAVENOUS at 19:32

## 2023-05-01 RX ADMIN — Medication 1.5 EACH: at 19:32

## 2023-05-01 RX ADMIN — AMPICILLIN AND SULBACTAM 3 G: 2; 1 INJECTION, POWDER, FOR SOLUTION INTRAVENOUS at 23:26

## 2023-05-01 RX ADMIN — ROCURONIUM BROMIDE 20 MG: 50 INJECTION, SOLUTION INTRAVENOUS at 20:10

## 2023-05-01 RX ADMIN — KETOROLAC TROMETHAMINE 30 MG: 30 INJECTION, SOLUTION INTRAMUSCULAR at 20:45

## 2023-05-01 RX ADMIN — PROPOFOL 100 MCG/KG/MIN: 10 INJECTION, EMULSION INTRAVENOUS at 19:40

## 2023-05-01 RX ADMIN — ONDANSETRON 4 MG: 2 INJECTION INTRAMUSCULAR; INTRAVENOUS at 19:59

## 2023-05-01 ASSESSMENT — ACTIVITIES OF DAILY LIVING (ADL)
ADLS_ACUITY_SCORE: 35

## 2023-05-01 ASSESSMENT — ENCOUNTER SYMPTOMS
FEVER: 0
APPETITE CHANGE: 0
BLOOD IN STOOL: 0
COUGH: 0
VOMITING: 0
DIARRHEA: 1
DYSURIA: 0
HEMATURIA: 0
SHORTNESS OF BREATH: 0
NAUSEA: 0
CHILLS: 0
ABDOMINAL PAIN: 1

## 2023-05-01 NOTE — ED TRIAGE NOTES
C/o abd pain since Friday but today since 1430 is very severe lower abd and tingling in hands, pt is pale and feeling fatigued.      Triage Assessment     Row Name 05/01/23 1528       Triage Assessment (Adult)    Airway WDL WDL       Respiratory WDL    Respiratory WDL WDL       Cardiac WDL    Cardiac WDL WDL

## 2023-05-01 NOTE — ED PROVIDER NOTES
History     Chief Complaint   Patient presents with     Abdominal Pain     HPI  Darell Collazo is a 28 year old male who is otherwise healthy presents to the emergency department for evaluation of lower abdominal pain.  He has been experiencing intermittent nonbloody diarrhea about 1-2 weeks ago he had diarrhea 1-2 time per day for a week and a half. He did not have much pain then.  He has been good for the last week. He then developed some lower abdominal pain 3 days ago that became severe this afternoon.  The pain is localized to the right lower quadrant and suprapubic regions and does not radiate anywhere.  His pain is worsened with movement.  He denies known fevers, urinary symptoms, nausea, or vomiting.  He did notice some new tingling in bilateral fingers today.  He has been able to tolerate oral intake until today.  He has not had anything to eat or drink since last night at 6 pm.   He denies history of IBD or abdominal surgeries.  His wife did have similar symptoms last week.  No family history of inflammatory bowel disease.         Allergies:  No Known Allergies    Problem List:    There are no problems to display for this patient.       Past Medical History:    History reviewed. No pertinent past medical history.    Past Surgical History:    Past Surgical History:   Procedure Laterality Date     VASECTOMY N/A 09/10/2021       Family History:    History reviewed. No pertinent family history.    Social History:  Marital Status:   [2]  Social History     Tobacco Use     Smoking status: Never     Smokeless tobacco: Never   Vaping Use     Vaping status: Never Used     Passive vaping exposure: Yes   Substance Use Topics     Alcohol use: Yes     Drug use: No        Medications:    No current outpatient medications on file.        Review of Systems   Constitutional: Negative for appetite change, chills and fever.   Respiratory: Negative for cough and shortness of breath.    Cardiovascular: Negative for  chest pain.   Gastrointestinal: Positive for abdominal pain and diarrhea. Negative for blood in stool, nausea and vomiting.   Genitourinary: Negative for dysuria and hematuria.   All other systems reviewed and are negative.      Physical Exam   BP: 100/43  Pulse: 77  Temp: 99.1  F (37.3  C)  Resp: 18  Weight: 74.8 kg (165 lb)  SpO2: 100 %      Physical Exam  Vitals and nursing note reviewed.   Constitutional:       Appearance: He is ill-appearing.   HENT:      Mouth/Throat:      Mouth: Mucous membranes are dry.   Cardiovascular:      Rate and Rhythm: Normal rate and regular rhythm.   Pulmonary:      Effort: Pulmonary effort is normal.      Breath sounds: Normal breath sounds.   Abdominal:      General: Bowel sounds are normal.      Tenderness: There is abdominal tenderness in the right lower quadrant and suprapubic area. There is guarding. There is no rebound.   Skin:     Coloration: Skin is pale.   Neurological:      Mental Status: He is alert.         ED Course                 Procedures                Results for orders placed or performed during the hospital encounter of 05/01/23 (from the past 24 hour(s))   [place] Draw    Narrative    The following orders were created for panel order [place] Draw.  Procedure                               Abnormality         Status                     ---------                               -----------         ------                     Extra Green Top (Lithium...[[id number]]                      Final result               Extra Purple Top Tube[[id number]]                            Final result                 Please view results for these tests on the individual orders.   Extra Green Top (Lithium Heparin) Tube   Result Value Ref Range    Hold Specimen JIC    Extra Purple Top Tube   Result Value Ref Range    Hold Specimen JI    Comprehensive metabolic panel   Result Value Ref Range    Sodium 135 (L) 136 - 145 mmol/L    Potassium 3.6 3.4 - 5.3 mmol/L    Chloride 96 (L) 98 - 107  mmol/L    Carbon Dioxide (CO2) 19 (L) 22 - 29 mmol/L    Anion Gap 20 (H) 7 - 15 mmol/L    Urea Nitrogen 11.4 6.0 - 20.0 mg/dL    Creatinine 0.96 0.67 - 1.17 mg/dL    Calcium 9.3 8.6 - 10.0 mg/dL    Glucose 124 (H) 70 - 99 mg/dL    Alkaline Phosphatase 91 40 - 129 U/L    AST 19 10 - 50 U/L    ALT 17 10 - 50 U/L    Protein Total 7.9 6.4 - 8.3 g/dL    Albumin 4.4 3.5 - 5.2 g/dL    Bilirubin Total 1.8 (H) <=1.2 mg/dL    GFR Estimate >90 >60 mL/min/1.73m2   CBC with platelets differential    Narrative    The following orders were created for panel order CBC with platelets differential.  Procedure                               Abnormality         Status                     ---------                               -----------         ------                     CBC with platelets and d...[[id number]]  Abnormal            Final result                 Please view results for these tests on the individual orders.   Lipase   Result Value Ref Range    Lipase 24 13 - 60 U/L   CBC with platelets and differential   Result Value Ref Range    WBC Count 14.2 (H) 4.0 - 11.0 10e3/uL    RBC Count 6.70 (H) 4.40 - 5.90 10e6/uL    Hemoglobin 12.9 (L) 13.3 - 17.7 g/dL    Hematocrit 40.4 40.0 - 53.0 %    MCV 60 (L) 78 - 100 fL    MCH 19.3 (L) 26.5 - 33.0 pg    MCHC 31.9 31.5 - 36.5 g/dL    RDW 17.9 (H) 10.0 - 15.0 %    Platelet Count 238 150 - 450 10e3/uL    % Neutrophils 78 %    % Lymphocytes 12 %    % Monocytes 9 %    % Eosinophils 0 %    % Basophils 0 %    % Immature Granulocytes 1 %    NRBCs per 100 WBC 0 <1 /100    Absolute Neutrophils 11.1 (H) 1.6 - 8.3 10e3/uL    Absolute Lymphocytes 1.7 0.8 - 5.3 10e3/uL    Absolute Monocytes 1.2 0.0 - 1.3 10e3/uL    Absolute Eosinophils 0.1 0.0 - 0.7 10e3/uL    Absolute Basophils 0.1 0.0 - 0.2 10e3/uL    Absolute Immature Granulocytes 0.1 <=0.4 10e3/uL    Absolute NRBCs 0.0 10e3/uL   Erythrocyte sedimentation rate auto   Result Value Ref Range    Erythrocyte Sedimentation Rate 18 (H) 0 - 15 mm/hr   CRP  inflammation   Result Value Ref Range    CRP Inflammation 118.32 (H) <5.00 mg/L   Bilirubin direct   Result Value Ref Range    Bilirubin Direct 0.36 (H) 0.00 - 0.30 mg/dL   CT Abdomen Pelvis w Contrast    Narrative    EXAM: CT ABDOMEN PELVIS W CONTRAST  LOCATION: [facility]  DATE/TIME: 5/1/2023 4:52 PM CDT    INDICATION: RLQ abdominal pain with diarrhea  COMPARISON: None.  TECHNIQUE: CT scan of the abdomen and pelvis was performed following injection of IV contrast. Multiplanar reformats were obtained. Dose reduction techniques were used.  CONTRAST: Isovue 370, 81 mL    FINDINGS:   LOWER CHEST: Normal.    HEPATOBILIARY: Normal.    PANCREAS: Normal.    SPLEEN: Normal.    ADRENAL GLANDS: Normal.    KIDNEYS/BLADDER: Normal.    BOWEL: 1 cm appendicolith present at base of the appendix. The appendix is fluid-filled, dilated and inflamed consistent with appendicitis. There is prominent surrounding soft tissue stranding especially involving the appendiceal base and cecal tip.   There is questionable loss of integrity of the appendiceal wall near the appendicolith and a mild amount of free fluid, early perforation can't be excluded. There is mild wall thickening and enhancement involving numerous loops of adjacent small bowel.   Trace fluid fluid seen within each gutter as well as free fluid in the pelvis but no localized abscess. No free air evident. No bowel obstruction.    LYMPH NODES: Mild reactive adenopathy in the ileocolic distribution.    VASCULATURE: Unremarkable.    PELVIC ORGANS: Mild free fluid.    MUSCULOSKELETAL: Normal.      Impression    IMPRESSION:   1.  Complicated appendicitis. 1 cm obstructing appendicolith with loss of integrity of the appendiceal wall near the base and concern for early perforation.  2.  Abundant surrounding soft tissue stranding, cecal and adjacent small bowel wall thickening and a mild amount of free ascites.       Medications   0.9% sodium chloride  BOLUS (0 mLs Intravenous Stopped 5/1/23 1732)     Followed by   sodium chloride 0.9% infusion (has no administration in time range)   HYDROmorphone (PF) (DILAUDID) injection 0.5 mg (0.5 mg Intravenous $Given 5/1/23 1600)   ondansetron (ZOFRAN) injection 4 mg (has no administration in time range)   0.9% sodium chloride BOLUS (has no administration in time range)     Followed by   sodium chloride 0.9% infusion (has no administration in time range)   ampicillin-sulbactam (UNASYN) 3 g vial to attach to  mL bag (3 g Intravenous $New Bag 5/1/23 1739)   iopamidol (ISOVUE-370) solution 500 mL (80 mLs Intravenous $Given 5/1/23 1647)   sodium chloride 0.9 % bag 100mL for CT scan flush use (60 mLs Intravenous $Given 5/1/23 1646)       Assessments & Plan (with Medical Decision Making)  28 yr old with severe rlq abdominal pain, anorexia, nausea, vomiting but no fever.  No family history of inflammatory bowel disease.  He did have an episode of loose stools for about a week and ended about a week and a half ago.  No surgical history of the abdomen.  He takes no medications.  He is not blood thinners.  He is appropriately NPO.  I reviewed the labs with the patient including a leukocytosis, elevated bilirubin with mild elevation of direct bilirubin.  AST and ALT are normal.  CT scan shows acute appendicitis with possible perforation and appendicolith with inflammatory findings surrounding it. No tachycardia.  sbp near 100 which it was at his last office visit.   Patient given a dose of Unasyn.  Discussed the case with  he would like to see the patient to the operating room at 7 PM and agrees with the antibiotic choice and n.p.o.  He thinks the patient most likely will need to stay in the hospital overnight.  We are checking to see if there is any bed availability here.  Patient is feeling much better after the Dilaudid.  He did vomit up all of the water that we attempted to give him for CT scan.  We were able to  confirm bed availability here and therefore the patient will be taken to the operating room.  He is medically cleared for surgery.   D/w Dr. Taveras.  Night hospitalist will do an H and P after the surgery.  Agrees with the admission.       I have reviewed the nursing notes.    I have reviewed the findings, diagnosis, plan and need for follow up with the patient.           Medical Decision Making  The patient's presentation was of moderate complexity (an acute illness with systemic symptoms).    The patient's evaluation involved:  ordering and/or review of 3+ test(s) in this encounter (see separate area of note for details)    The patient's management necessitated high risk (a decision regarding hospitalization).        New Prescriptions    No medications on file       Final diagnoses:   Acute appendicitis with perforation and localized peritonitis, without abscess, unspecified whether gangrene present       5/1/2023   [facility] EMERGENCY DEPT     Saeid He MD  05/01/23 2102       Saeid He MD  05/01/23 1752       Saeid He MD  05/01/23 5326

## 2023-05-02 ENCOUNTER — TELEPHONE (OUTPATIENT)
Dept: SURGERY | Facility: CLINIC | Age: 28
End: 2023-05-02
Payer: COMMERCIAL

## 2023-05-02 PROBLEM — K35.32 ACUTE APPENDICITIS WITH PERFORATION AND LOCALIZED PERITONITIS, WITHOUT ABSCESS, UNSPECIFIED WHETHER GANGRENE PRESENT: Status: ACTIVE | Noted: 2023-05-02

## 2023-05-02 LAB
ALBUMIN SERPL BCG-MCNC: 3.6 G/DL (ref 3.5–5.2)
ALP SERPL-CCNC: 70 U/L (ref 40–129)
ALT SERPL W P-5'-P-CCNC: 13 U/L (ref 10–50)
ANION GAP SERPL CALCULATED.3IONS-SCNC: 10 MMOL/L (ref 7–15)
AST SERPL W P-5'-P-CCNC: 15 U/L (ref 10–50)
BILIRUB SERPL-MCNC: 1.3 MG/DL
BUN SERPL-MCNC: 12.1 MG/DL (ref 6–20)
CALCIUM SERPL-MCNC: 8.7 MG/DL (ref 8.6–10)
CHLORIDE SERPL-SCNC: 103 MMOL/L (ref 98–107)
CREAT SERPL-MCNC: 0.81 MG/DL (ref 0.67–1.17)
DEPRECATED HCO3 PLAS-SCNC: 25 MMOL/L (ref 22–29)
ERYTHROCYTE [DISTWIDTH] IN BLOOD BY AUTOMATED COUNT: 17.3 % (ref 10–15)
GFR SERPL CREATININE-BSD FRML MDRD: >90 ML/MIN/1.73M2
GLUCOSE SERPL-MCNC: 157 MG/DL (ref 70–99)
HCT VFR BLD AUTO: 37.7 % (ref 40–53)
HGB BLD-MCNC: 11.7 G/DL (ref 13.3–17.7)
MCH RBC QN AUTO: 18.9 PG (ref 26.5–33)
MCHC RBC AUTO-ENTMCNC: 31 G/DL (ref 31.5–36.5)
MCV RBC AUTO: 61 FL (ref 78–100)
PLATELET # BLD AUTO: 217 10E3/UL (ref 150–450)
POTASSIUM SERPL-SCNC: 4.5 MMOL/L (ref 3.4–5.3)
PROT SERPL-MCNC: 6.8 G/DL (ref 6.4–8.3)
RBC # BLD AUTO: 6.18 10E6/UL (ref 4.4–5.9)
SODIUM SERPL-SCNC: 138 MMOL/L (ref 136–145)
WBC # BLD AUTO: 12 10E3/UL (ref 4–11)

## 2023-05-02 PROCEDURE — 258N000003 HC RX IP 258 OP 636: Performed by: INTERNAL MEDICINE

## 2023-05-02 PROCEDURE — 250N000013 HC RX MED GY IP 250 OP 250 PS 637: Performed by: INTERNAL MEDICINE

## 2023-05-02 PROCEDURE — 120N000001 HC R&B MED SURG/OB

## 2023-05-02 PROCEDURE — 250N000011 HC RX IP 250 OP 636: Performed by: SURGERY

## 2023-05-02 PROCEDURE — 80053 COMPREHEN METABOLIC PANEL: CPT | Performed by: SURGERY

## 2023-05-02 PROCEDURE — 85027 COMPLETE CBC AUTOMATED: CPT | Performed by: INTERNAL MEDICINE

## 2023-05-02 PROCEDURE — 99232 SBSQ HOSP IP/OBS MODERATE 35: CPT | Performed by: NURSE PRACTITIONER

## 2023-05-02 PROCEDURE — 36415 COLL VENOUS BLD VENIPUNCTURE: CPT | Performed by: SURGERY

## 2023-05-02 PROCEDURE — 250N000013 HC RX MED GY IP 250 OP 250 PS 637: Performed by: SURGERY

## 2023-05-02 RX ORDER — LIDOCAINE 40 MG/G
CREAM TOPICAL
Status: DISCONTINUED | OUTPATIENT
Start: 2023-05-02 | End: 2023-05-03 | Stop reason: HOSPADM

## 2023-05-02 RX ORDER — KETOROLAC TROMETHAMINE 15 MG/ML
15 INJECTION, SOLUTION INTRAMUSCULAR; INTRAVENOUS EVERY 6 HOURS
Status: COMPLETED | OUTPATIENT
Start: 2023-05-02 | End: 2023-05-03

## 2023-05-02 RX ORDER — PROCHLORPERAZINE MALEATE 5 MG
10 TABLET ORAL EVERY 6 HOURS PRN
Status: DISCONTINUED | OUTPATIENT
Start: 2023-05-02 | End: 2023-05-03 | Stop reason: HOSPADM

## 2023-05-02 RX ORDER — HYDROMORPHONE HCL IN WATER/PF 6 MG/30 ML
0.2 PATIENT CONTROLLED ANALGESIA SYRINGE INTRAVENOUS
Status: DISCONTINUED | OUTPATIENT
Start: 2023-05-02 | End: 2023-05-03 | Stop reason: HOSPADM

## 2023-05-02 RX ORDER — OXYCODONE HYDROCHLORIDE 5 MG/1
10 TABLET ORAL EVERY 4 HOURS PRN
Status: DISCONTINUED | OUTPATIENT
Start: 2023-05-02 | End: 2023-05-03 | Stop reason: HOSPADM

## 2023-05-02 RX ORDER — POLYETHYLENE GLYCOL 3350 17 G/17G
17 POWDER, FOR SOLUTION ORAL DAILY
Status: DISCONTINUED | OUTPATIENT
Start: 2023-05-02 | End: 2023-05-03 | Stop reason: HOSPADM

## 2023-05-02 RX ORDER — OXYCODONE HYDROCHLORIDE 5 MG/1
5 TABLET ORAL EVERY 4 HOURS PRN
Status: DISCONTINUED | OUTPATIENT
Start: 2023-05-02 | End: 2023-05-03 | Stop reason: HOSPADM

## 2023-05-02 RX ORDER — ACETAMINOPHEN 325 MG/1
650 TABLET ORAL EVERY 4 HOURS PRN
Status: DISCONTINUED | OUTPATIENT
Start: 2023-05-04 | End: 2023-05-03 | Stop reason: HOSPADM

## 2023-05-02 RX ORDER — ENOXAPARIN SODIUM 100 MG/ML
40 INJECTION SUBCUTANEOUS EVERY 24 HOURS
Status: DISCONTINUED | OUTPATIENT
Start: 2023-05-02 | End: 2023-05-02

## 2023-05-02 RX ORDER — AMOXICILLIN 250 MG
1 CAPSULE ORAL 2 TIMES DAILY
Status: DISCONTINUED | OUTPATIENT
Start: 2023-05-02 | End: 2023-05-03 | Stop reason: HOSPADM

## 2023-05-02 RX ORDER — ACETAMINOPHEN 325 MG/1
975 TABLET ORAL EVERY 8 HOURS
Status: DISCONTINUED | OUTPATIENT
Start: 2023-05-02 | End: 2023-05-03 | Stop reason: HOSPADM

## 2023-05-02 RX ORDER — ONDANSETRON 4 MG/1
4 TABLET, ORALLY DISINTEGRATING ORAL EVERY 6 HOURS PRN
Status: DISCONTINUED | OUTPATIENT
Start: 2023-05-02 | End: 2023-05-03 | Stop reason: HOSPADM

## 2023-05-02 RX ORDER — ONDANSETRON 2 MG/ML
4 INJECTION INTRAMUSCULAR; INTRAVENOUS EVERY 6 HOURS PRN
Status: DISCONTINUED | OUTPATIENT
Start: 2023-05-02 | End: 2023-05-03 | Stop reason: HOSPADM

## 2023-05-02 RX ORDER — BISACODYL 10 MG
10 SUPPOSITORY, RECTAL RECTAL DAILY PRN
Status: DISCONTINUED | OUTPATIENT
Start: 2023-05-02 | End: 2023-05-03 | Stop reason: HOSPADM

## 2023-05-02 RX ORDER — HYDROMORPHONE HCL IN WATER/PF 6 MG/30 ML
0.4 PATIENT CONTROLLED ANALGESIA SYRINGE INTRAVENOUS
Status: DISCONTINUED | OUTPATIENT
Start: 2023-05-02 | End: 2023-05-03 | Stop reason: HOSPADM

## 2023-05-02 RX ADMIN — SENNOSIDES AND DOCUSATE SODIUM 1 TABLET: 8.6; 5 TABLET ORAL at 08:28

## 2023-05-02 RX ADMIN — AMPICILLIN AND SULBACTAM 3 G: 2; 1 INJECTION, POWDER, FOR SOLUTION INTRAVENOUS at 23:25

## 2023-05-02 RX ADMIN — KETOROLAC TROMETHAMINE 15 MG: 15 INJECTION, SOLUTION INTRAMUSCULAR; INTRAVENOUS at 20:11

## 2023-05-02 RX ADMIN — KETOROLAC TROMETHAMINE 15 MG: 15 INJECTION, SOLUTION INTRAMUSCULAR; INTRAVENOUS at 14:20

## 2023-05-02 RX ADMIN — ACETAMINOPHEN 975 MG: 325 TABLET ORAL at 13:03

## 2023-05-02 RX ADMIN — ACETAMINOPHEN 650 MG: 325 TABLET ORAL at 04:07

## 2023-05-02 RX ADMIN — OXYCODONE HYDROCHLORIDE 5 MG: 5 TABLET ORAL at 18:36

## 2023-05-02 RX ADMIN — AMPICILLIN AND SULBACTAM 3 G: 2; 1 INJECTION, POWDER, FOR SOLUTION INTRAVENOUS at 05:42

## 2023-05-02 RX ADMIN — SODIUM CHLORIDE: 9 INJECTION, SOLUTION INTRAVENOUS at 14:20

## 2023-05-02 RX ADMIN — SODIUM CHLORIDE: 9 INJECTION, SOLUTION INTRAVENOUS at 20:22

## 2023-05-02 RX ADMIN — POLYETHYLENE GLYCOL 3350 17 G: 17 POWDER, FOR SOLUTION ORAL at 08:29

## 2023-05-02 RX ADMIN — ACETAMINOPHEN 975 MG: 325 TABLET ORAL at 20:10

## 2023-05-02 RX ADMIN — KETOROLAC TROMETHAMINE 15 MG: 15 INJECTION, SOLUTION INTRAMUSCULAR; INTRAVENOUS at 08:29

## 2023-05-02 RX ADMIN — AMPICILLIN AND SULBACTAM 3 G: 2; 1 INJECTION, POWDER, FOR SOLUTION INTRAVENOUS at 13:03

## 2023-05-02 RX ADMIN — AMPICILLIN AND SULBACTAM 3 G: 2; 1 INJECTION, POWDER, FOR SOLUTION INTRAVENOUS at 18:18

## 2023-05-02 ASSESSMENT — ACTIVITIES OF DAILY LIVING (ADL)
ADLS_ACUITY_SCORE: 18
ADLS_ACUITY_SCORE: 35
ADLS_ACUITY_SCORE: 18
ADLS_ACUITY_SCORE: 35
ADLS_ACUITY_SCORE: 18

## 2023-05-02 NOTE — PLAN OF CARE
Goal Outcome Evaluation:      Plan of Care Reviewed With: patient    Overall Patient Progress: improvingOverall Patient Progress: improving    Outcome Evaluation: Vss. Pain to abdomen rated 1/10, prn tylenol given. Bowel sounds are hypoactive, abdomen slight tender, soft. Pt ambulated to bathroom to void. Receiving IV fluids and iV abx.

## 2023-05-02 NOTE — ANESTHESIA POSTPROCEDURE EVALUATION
Patient: Darell Collazo    Procedure: Procedure(s):  APPENDECTOMY and Partial Cecectomy, LAPAROSCOPIC       Anesthesia Type:  General    Note:  Disposition: Outpatient   Postop Pain Control: Uneventful            Sign Out: Well controlled pain   PONV: No   Neuro/Psych: Uneventful            Sign Out: Acceptable/Baseline neuro status   Airway/Respiratory: Uneventful            Sign Out: Acceptable/Baseline resp. status   CV/Hemodynamics: Uneventful            Sign Out: Acceptable CV status   Other NRE: NONE   DID A NON-ROUTINE EVENT OCCUR? No    Event details/Postop Comments:  Pt was happy with anesthesia care.  No complications.  I will follow up with the pt if needed.           Last vitals:  Vitals Value Taken Time   /74 05/01/23 2201   Temp 99.2  F (37.3  C) 05/01/23 2200   Pulse 90 05/01/23 2203   Resp 18 05/01/23 2203   SpO2 95 % 05/01/23 2204   Vitals shown include unvalidated device data.    Electronically Signed By: ENRRIQUE Purdy CRNA  May 2, 2023  1:29 PM

## 2023-05-02 NOTE — PROGRESS NOTES
Pt arrived to med/surg Room 273 from PACU. Pt is alert, oriented x4. Rates pain to abdomen 1/10, declines pain medications at this time. Bowel sounds are hypoactive. Laparoscopic sites to abdomen x3. Vss.

## 2023-05-02 NOTE — ANESTHESIA CARE TRANSFER NOTE
Patient: Darell Collazo    Procedure: Procedure(s):  APPENDECTOMY and Partial Cecectomy, LAPAROSCOPIC       Diagnosis: Acute appendicitis with perforation and localized peritonitis, without abscess, unspecified whether gangrene present [K35.32]  Diagnosis Additional Information: No value filed.    Anesthesia Type:   General     Note:    Oropharynx: oropharynx clear of all foreign objects and spontaneously breathing  Level of Consciousness: drowsy  Oxygen Supplementation: face mask    Independent Airway: airway patency satisfactory and stable  Dentition: dentition unchanged  Vital Signs Stable: post-procedure vital signs reviewed and stable  Report to RN Given: handoff report given  Patient transferred to: PACU    Handoff Report: Identifed the Patient, Identified the Reponsible Provider, Reviewed the pertinent medical history, Discussed the surgical course, Reviewed Intra-OP anesthesia mangement and issues during anesthesia, Set expectations for post-procedure period and Allowed opportunity for questions and acknowledgement of understanding      Vitals:  Vitals Value Taken Time   /71 05/01/23 2116   Temp     Pulse 110 05/01/23 2117   Resp 25 05/01/23 2117   SpO2     Vitals shown include unvalidated device data.    Electronically Signed By: ENRRIQUE Reno CRNA  May 1, 2023  9:18 PM

## 2023-05-02 NOTE — ANESTHESIA PREPROCEDURE EVALUATION
Anesthesia Pre-Procedure Evaluation    Patient: Darell Collazo   MRN: [id number] : 1995        Procedure : Procedure(s):  APPENDECTOMY, LAPAROSCOPIC          History reviewed. No pertinent past medical history.   Past Surgical History:   Procedure Laterality Date     VASECTOMY N/A 09/10/2021      No Known Allergies   Social History     Tobacco Use     Smoking status: Never     Smokeless tobacco: Never   Vaping Use     Vaping status: Never Used     Passive vaping exposure: Yes   Substance Use Topics     Alcohol use: Yes      Wt Readings from Last 1 Encounters:   23 74.8 kg (165 lb)        Anesthesia Evaluation   Pt has had prior anesthetic. Type: MAC and General.    No history of anesthetic complications       ROS/MED HX  ENT/Pulmonary:  - neg pulmonary ROS     Neurologic:  - neg neurologic ROS     Cardiovascular:  - neg cardiovascular ROS     METS/Exercise Tolerance: >4 METS    Hematologic:  - neg hematologic  ROS     Musculoskeletal:  - neg musculoskeletal ROS     GI/Hepatic:  - neg GI/hepatic ROS     Renal/Genitourinary:  - neg Renal ROS     Endo:  - neg endo ROS     Psychiatric/Substance Use:  - neg psychiatric ROS     Infectious Disease:  - neg infectious disease ROS     Malignancy:  - neg malignancy ROS     Other:  - neg other ROS          Physical Exam    Airway  airway exam normal      Mallampati: II   TM distance: > 3 FB   Neck ROM: full   Mouth opening: > 3 cm    Respiratory Devices and Support         Dental           Cardiovascular   cardiovascular exam normal       Rhythm and rate: regular and normal     Pulmonary   pulmonary exam normal        breath sounds clear to auscultation           OUTSIDE LABS:  CBC:   Lab Results   Component Value Date    WBC 14.2 (H) 2023    HGB 12.9 (L) 2023    HCT 40.4 2023     2023     BMP:   Lab Results   Component Value Date     (L) 2023    POTASSIUM 3.6 2023    CHLORIDE 96 (L) 2023    CO2 19 (L)  05/01/2023    BUN 11.4 05/01/2023    CR 0.96 05/01/2023     (H) 05/01/2023     COAGS: No results found for: PTT, INR, FIBR  POC: No results found for: BGM, HCG, HCGS  HEPATIC:   Lab Results   Component Value Date    ALBUMIN 4.4 05/01/2023    PROTTOTAL 7.9 05/01/2023    ALT 17 05/01/2023    AST 19 05/01/2023    ALKPHOS 91 05/01/2023    BILITOTAL 1.8 (H) 05/01/2023     OTHER:   Lab Results   Component Value Date    GRACE 9.3 05/01/2023    LIPASE 24 05/01/2023    SED 18 (H) 05/01/2023       Anesthesia Plan    ASA Status:  1, emergent    NPO Status:  NPO Appropriate    Anesthesia Type: General.     - Airway: ETT   Induction: Intravenous.   Maintenance: Balanced.        Consents    Anesthesia Plan(s) and associated risks, benefits, and realistic alternatives discussed. Questions answered and patient/representative(s) expressed understanding.     - Discussed: Risks, Benefits and Alternatives for BOTH SEDATION and the PROCEDURE were discussed     - Discussed with:  Patient      - Extended Intubation/Ventilatory Support Discussed: No.      - Patient is DNR/DNI Status: No    Use of blood products discussed: No .     Postoperative Care    Pain management: IV analgesics, Multi-modal analgesia.   PONV prophylaxis: Ondansetron (or other 5HT-3), Dexamethasone or Solumedrol, Background Propofol Infusion     Comments:    Other Comments: The risks and benefits of anesthesia, and the alternatives where applicable, have been discussed with the patient, and they wish to proceed.            ENRRIQUE Reno CRNA

## 2023-05-02 NOTE — OR NURSING
Transfer from pacu to Room room 273  Transferred to bed via bed       S: 29 y/o male S/P appendectomy   Anesthesia Type: general  Surgeon: Dr. Dr. Marshall  Allergies: See Medication Reconciliation Record    B: Pertinent Medical History:   Past Medical History:   Diagnosis Date     Acute appendicitis with perforation and gangrene 05/01/2023       Surgical History:   Past Surgical History:   Procedure Laterality Date     LAPAROSCOPIC APPENDECTOMY  05/01/2023     LAPAROSCOPIC APPENDECTOMY N/A 5/1/2023    Procedure: APPENDECTOMY and Partial Cecectomy, LAPAROSCOPIC;  Surgeon: Alan Marshall MD;  Location: [facility] OR     VASECTOMY N/A 09/10/2021         A: EBL: 10 ml  UOP: 0  NPO: No   Vomiting: No   Drainage: none  Skin Integrity: normal   Pain: 1  On 0-10 Scale  See PACU record for ongoing assessment, vital signs and pain assessment.    Report Given to:  Radha ROBLERO    R: Post-Op vitals and assessments as ordered/indicated per patient's condition.  Follow Post-Op orders and notify Physician prn.  Continue to involve patient/family in plan of care and discharge planning.  Reinforce Pre-Operative education.  Implement skin safety interventions as appropriate.

## 2023-05-02 NOTE — TELEPHONE ENCOUNTER
Type of surgery: APPENDECTOMY, LAPAROSCOPIC  Location of surgery: [facility]  Date and time of surgery: 5/1  Surgeon: taty   Pre-Op Appt Date: emergent   Post-Op Appt Date: will call    Packet sent out: Not Applicable  Pre-cert/Authorization completed:

## 2023-05-02 NOTE — PROGRESS NOTES
[facility] Post-Op Note         Assessment and Plan:    Assessment:   Post-operative day #1  Procedure(s):  APPENDECTOMY and Partial Cecectomy, LAPAROSCOPIC  No immediate surgical complications identified.  Pain well-controlled.  WBC 12.0       Plan:   Ambulation encouraged  Advance activity as tolerated  Advance diet as tolerated   Continue antibiotics  Monitor progress  Discharge likely tomorrow am as WBC still elevated slightly           Interval History:     Doing well.  Continues to improve.  Pain is well-controlled.  No fevers.  Oral intake was slow this am with liquids.  Patient reports sensation of fullness. No nausea or vomiting            Physical Exam:   All vitals have been reviewed  Patient Vitals for the past 8 hrs:   BP Temp Temp src Pulse Resp SpO2   05/02/23 0818 103/59 98  F (36.7  C) Oral 55 20 99 %   05/02/23 0354 114/60 97.8  F (36.6  C) Oral 61 20 98 %     I/O last 3 completed shifts:  In: 1506 [I.V.:1506]  Out: 10 [Blood:10]  # Pain Assessment:      5/2/2023     3:54 AM   Current Pain Score   Patient currently in pain? yes   - Darell is experiencing pain due to post operative state. Pain management was discussed and the plan was created in a collaborative fashion.  Darell's response to the current recommendations: compliant  - Opioid regimen: oxycodone  - Response to opioid medications: Reduction of symptoms     Incisions clean and dry with no drainage.  Surrounding skin with minimal erythema.  Skin glue utilized.  Mild bloating.  No pain on palpation.           Data:   All laboratory/imaging data related to this surgery reviewed  Results for orders placed or performed during the hospital encounter of 05/01/23 (from the past 24 hour(s))   [place] Draw    Narrative    The following orders were created for panel order [place] Draw.  Procedure                               Abnormality         Status                     ---------                               -----------          ------                     Extra Green Top (Lithium...[[id number]]                      Final result               Extra Purple Top Tube[[id number]]                            Final result                 Please view results for these tests on the individual orders.   Extra Green Top ([place] Heparin) Tube   Result Value Ref Range    Hold Specimen JIC    Extra Purple Top Tube   Result Value Ref Range    Hold Specimen JI    Comprehensive metabolic panel   Result Value Ref Range    Sodium 135 (L) 136 - 145 mmol/L    Potassium 3.6 3.4 - 5.3 mmol/L    Chloride 96 (L) 98 - 107 mmol/L    Carbon Dioxide (CO2) 19 (L) 22 - 29 mmol/L    Anion Gap 20 (H) 7 - 15 mmol/L    Urea Nitrogen 11.4 6.0 - 20.0 mg/dL    Creatinine 0.96 0.67 - 1.17 mg/dL    Calcium 9.3 8.6 - 10.0 mg/dL    Glucose 124 (H) 70 - 99 mg/dL    Alkaline Phosphatase 91 40 - 129 U/L    AST 19 10 - 50 U/L    ALT 17 10 - 50 U/L    Protein Total 7.9 6.4 - 8.3 g/dL    Albumin 4.4 3.5 - 5.2 g/dL    Bilirubin Total 1.8 (H) <=1.2 mg/dL    GFR Estimate >90 >60 mL/min/1.73m2   CBC with platelets differential    Narrative    The following orders were created for panel order CBC with platelets differential.  Procedure                               Abnormality         Status                     ---------                               -----------         ------                     CBC with platelets and d...[[id number]]  Abnormal            Final result                 Please view results for these tests on the individual orders.   Lipase   Result Value Ref Range    Lipase 24 13 - 60 U/L   CBC with platelets and differential   Result Value Ref Range    WBC Count 14.2 (H) 4.0 - 11.0 10e3/uL    RBC Count 6.70 (H) 4.40 - 5.90 10e6/uL    Hemoglobin 12.9 (L) 13.3 - 17.7 g/dL    Hematocrit 40.4 40.0 - 53.0 %    MCV 60 (L) 78 - 100 fL    MCH 19.3 (L) 26.5 - 33.0 pg    MCHC 31.9 31.5 - 36.5 g/dL    RDW 17.9 (H) 10.0 - 15.0 %    Platelet Count 238 150 - 450 10e3/uL    % Neutrophils 78 %     % Lymphocytes 12 %    % Monocytes 9 %    % Eosinophils 0 %    % Basophils 0 %    % Immature Granulocytes 1 %    NRBCs per 100 WBC 0 <1 /100    Absolute Neutrophils 11.1 (H) 1.6 - 8.3 10e3/uL    Absolute Lymphocytes 1.7 0.8 - 5.3 10e3/uL    Absolute Monocytes 1.2 0.0 - 1.3 10e3/uL    Absolute Eosinophils 0.1 0.0 - 0.7 10e3/uL    Absolute Basophils 0.1 0.0 - 0.2 10e3/uL    Absolute Immature Granulocytes 0.1 <=0.4 10e3/uL    Absolute NRBCs 0.0 10e3/uL   Erythrocyte sedimentation rate auto   Result Value Ref Range    Erythrocyte Sedimentation Rate 18 (H) 0 - 15 mm/hr   CRP inflammation   Result Value Ref Range    CRP Inflammation 118.32 (H) <5.00 mg/L   Bilirubin direct   Result Value Ref Range    Bilirubin Direct 0.36 (H) 0.00 - 0.30 mg/dL   CT Abdomen Pelvis w Contrast    Narrative    EXAM: CT ABDOMEN PELVIS W CONTRAST  LOCATION: [facility]  DATE/TIME: 5/1/2023 4:52 PM CDT    INDICATION: RLQ abdominal pain with diarrhea  COMPARISON: None.  TECHNIQUE: CT scan of the abdomen and pelvis was performed following injection of IV contrast. Multiplanar reformats were obtained. Dose reduction techniques were used.  CONTRAST: Isovue 370, 81 mL    FINDINGS:   LOWER CHEST: Normal.    HEPATOBILIARY: Normal.    PANCREAS: Normal.    SPLEEN: Normal.    ADRENAL GLANDS: Normal.    KIDNEYS/BLADDER: Normal.    BOWEL: 1 cm appendicolith present at base of the appendix. The appendix is fluid-filled, dilated and inflamed consistent with appendicitis. There is prominent surrounding soft tissue stranding especially involving the appendiceal base and cecal tip.   There is questionable loss of integrity of the appendiceal wall near the appendicolith and a mild amount of free fluid, early perforation can't be excluded. There is mild wall thickening and enhancement involving numerous loops of adjacent small bowel.   Trace fluid fluid seen within each gutter as well as free fluid in the pelvis but no localized  abscess. No free air evident. No bowel obstruction.    LYMPH NODES: Mild reactive adenopathy in the ileocolic distribution.    VASCULATURE: Unremarkable.    PELVIC ORGANS: Mild free fluid.    MUSCULOSKELETAL: Normal.      Impression    IMPRESSION:   1.  Complicated appendicitis. 1 cm obstructing appendicolith with loss of integrity of the appendiceal wall near the base and concern for early perforation.  2.  Abundant surrounding soft tissue stranding, cecal and adjacent small bowel wall thickening and a mild amount of free ascites.   Comprehensive metabolic panel   Result Value Ref Range    Sodium 138 136 - 145 mmol/L    Potassium 4.5 3.4 - 5.3 mmol/L    Chloride 103 98 - 107 mmol/L    Carbon Dioxide (CO2) 25 22 - 29 mmol/L    Anion Gap 10 7 - 15 mmol/L    Urea Nitrogen 12.1 6.0 - 20.0 mg/dL    Creatinine 0.81 0.67 - 1.17 mg/dL    Calcium 8.7 8.6 - 10.0 mg/dL    Glucose 157 (H) 70 - 99 mg/dL    Alkaline Phosphatase 70 40 - 129 U/L    AST 15 10 - 50 U/L    ALT 13 10 - 50 U/L    Protein Total 6.8 6.4 - 8.3 g/dL    Albumin 3.6 3.5 - 5.2 g/dL    Bilirubin Total 1.3 (H) <=1.2 mg/dL    GFR Estimate >90 >60 mL/min/1.73m2   CBC with platelets   Result Value Ref Range    WBC Count 12.0 (H) 4.0 - 11.0 10e3/uL    RBC Count 6.18 (H) 4.40 - 5.90 10e6/uL    Hemoglobin 11.7 (L) 13.3 - 17.7 g/dL    Hematocrit 37.7 (L) 40.0 - 53.0 %    MCV 61 (L) 78 - 100 fL    MCH 18.9 (L) 26.5 - 33.0 pg    MCHC 31.0 (L) 31.5 - 36.5 g/dL    RDW 17.3 (H) 10.0 - 15.0 %    Platelet Count 217 150 - 450 10e3/uL        Genet Sarmiento PA-C

## 2023-05-02 NOTE — OP NOTE
Date of Service: 5/1/2023     STAFF SURGEON:  Alan Marshall MD     ASSISTANT:  None.     PREOPERATIVE DIAGNOSIS:  Acute appendicitis with perforation     POSTOPERATIVE DIAGNOSIS:  Acute appendicitis with perforation and gangrene     NAME OF PROCEDURE:  Laparoscopic appendectomy and partial cecectomy     INDICATIONS FOR PROCEDURE:  The patient is a 28-year-old male with about 3-day history of abdominal pain that was persisting and worsening prior to presentation to the emergency room.  He had leukocytosis and mild tachycardia, CT showed acute appendicitis with some free fluid and appendicolith and apparent perforation of the appendiceal wall.  There is no formed abscess.  I offered laparoscopic appendectomy, risks, benefits, alternatives all discussed and consent obtained.     TYPE OF ANESTHESIA:  General.     COMPLICATIONS:  None.     ESTIMATED BLOOD LOSS:   10 mL.     DRAINS:  None.     SPECIMENS:  Appendix.     IMPLANTS:  None.     OPERATIVE FINDINGS: There was inflamed small bowel and cecum in the right lower quadrant around the appendix which had obvious perforation and gangrene.  There is purulent appearing fluid down in the pelvis and along the right gutter and some between loops of small bowel in the mid abdomen.  The perforation was near the base and some of the base tissues were still inflamed and friable so partial cecectomy completed to have staple line come across more robust tissues.     PROCEDURE DETAIL:  Following consent, the patient was brought from the preoperative holding area to the operating suite and laid in supine position and underwent general anesthesia with endotracheal intubation without difficulty.  The abdomen was prepped and draped in the usual fashion and then a timeout procedure was performed.  The patient received a dose of preoperative antibiotics, had sequentials for DVT prophylaxis.  Following the timeout, I made  an approximately 2 cm infraumbilical incision, elevated the  abdominal fascia then between two Kochers and divided with a curved Leong scissor and peritoneal access obtained.  I placed two 0 Vicryl stay sutures on either side of the fascial defect and inserted the Dominga cannula.  This was secured and attached to gas insufflation and pneumoperitoneum achieved without difficulty.  I then inserted a 5 mm 30-degree scope in the abdomen.  Inspection  showed obvious inflammatory changes with hyperemia of small bowel and peritoneum, some of the small bowel was starting to adhesed to each other starting to form phlegmon in the right lower quadrant.  Purulent fluid here in the right gutter did extend up to the right upper quadrant under the liver near the gallbladder and some even above.  Brief inspection around the abdomen, no other pathologies were noted.  I then placed additional 5 mm working ports, one suprapubic in midline and the other in the left lower quadrant.  The patient was then  positioned Trendelenburg and rotated to the left.  I used the blunt laparoscopic graspers to begin peeling apart the small bowel in the right lower quadrant identifying the terminal ileum and following this to the cecum.  There was purulent drainage which was suctioned.  I was able to identify the appendix and lift this up.  There is obvious perforation and gangrene of the tissues primarily in the proximal third but including the base.  I used the LigaSure to start dissecting the lateral attachments of the cecum to gain some medial mobility and then came across the mesentery of the appendix working up towards the base to help isolate that.  With the inflamed tissues here I decided to divide higher up with a partial cecectomy.  In order to have a proper window for the staple line I freed more of those lateral attachments working distally along the ascending colon.  I was able to isolate the terminal ileum and get a window across which I could bring the blue load of the laparoscopic stapler across.   The cecum was come across with 2 loads of the blue stapler. The appendix was freed, it was placed in an EndoCatch bag and removed from the abdomen and passed off for pathology.  Inspection of the staple lines showed that they were intact.  No bleeding.  I irrigated the area until clear.  I then worked at irrigating up the right abdomen above and under the liver, down in the pelvis and some along the left side.  This appeared fairly clear at completion. The working ports were removed under direct vision, followed by removal of the Dominga and desufflation of the abdomen.  The fascial defect closed using my previously placed stay sutures and an additional Vicryl figure-of-eight placed between.  Skin was closed using 4-0 Monocryl.  I injected 0.25% Marcaine with epinephrine 1:200,000 along the incisions for local anesthetic and incisions were then covered with Dermabond.  The patient tolerated the procedure well and was discharged from the operating room in stable condition.  He will be admitted for further recovery and antibiotic therapy.           Alan Marshall MD

## 2023-05-02 NOTE — PROGRESS NOTES
[facility]    Medicine Progress Note - Hospitalist Service    Date of Admission:  5/1/2023    Assessment & Plan     Appendicitis with perforation S/P Laparoscopic appendectomy and partial cecectomy-gangrenous cecum POD#1  Pain is controlled PRN Oxycodone   Having sensation of fullness after PO intake. No N/V  Encourage ambulation      Leukocytosis:   Down trending 14.2> 12.0   Monitor repeat CBC in AM     Hyperbilirubinemia   Down trending  T Bili 1.8> 1.3     Hyperglycemia-No hx of DM Suspect secondary to appendicitis and surgery  AM FSBS 157mg%  Check A1C and BS in AM   Recommend F/U PCP as OP for recheck of his blood sugar           Diet: Advance Diet as Tolerated: Clear Liquid Diet    DVT Prophylaxis: Enoxaparin (Lovenox) SQ  Hartmann Catheter: Not present  Lines: None     Cardiac Monitoring: None  Code Status: Full Code      Clinically Significant Risk Factors Present on Admission             # Anion Gap Metabolic Acidosis: Highest Anion Gap = 20 mmol/L in last 2 days, will monitor and treat as appropriate                   Disposition Plan      Expected Discharge Date: 05/04/2023                The patient's care was discussed with the Attending Physician, Dr. Hamm .    ENRRIQUE [facility]  Hospitalist Service  [facility]  Securely message with [employer] (more info)  Text page via [employer] Paging/Directory   ______________________________________________________________________    Interval History   Reviewed overnight notes, Surgery PA notes. No significant events     Physical Exam   Vital Signs: Temp: 98  F (36.7  C) Temp src: Oral BP: 103/59 Pulse: 55   Resp: 20 SpO2: 99 % O2 Device: None (Room air) Oxygen Delivery: 10 LPM  Weight: 165 lbs 5.52 oz    Constitutional: 27 yo male in bed, on RA and not in any distress  Eyes: sclera clear and conjunctiva normal  Hematologic / Lymphatic: No bleeding or bruising   Respiratory: No increased work of  breathing, good air exchange, clear to auscultation bilaterally, no crackles or wheezing  Cardiovascular: Normal apical impulse, regular rate and rhythm, normal S1 and S2, no S3 or S4, and no murmur noted  GI: hypoactive bowel sounds, soft, non-distended and non-tender  Skin: no bruising or bleeding, normal skin color, texture, turgor, no redness, warmth, or swelling and no rashes  Musculoskeletal: no lower extremity pitting edema present  Neurologic: A&Ox4, PAINTER, No focal deficits   Neuropsychiatric: General: normal, calm and normal eye contact  Level of consciousness: alert / normal  Affect: normal  Orientation: oriented to self, place, time and situation  Memory and insight: normal, memory for past and recent events intact and thought process normal    Medical Decision Making       45  MINUTES SPENT BY ME on the date of service doing chart review, history, exam, documentation & further activities per the note.      Data     I have personally reviewed the following data over the past 24 hrs:    12.0 (H)  \   11.7 (L)   / 217     138 103 12.1 /  157 (H)   4.5 25 0.81 \       ALT: 13 AST: 15 AP: 70 TBILI: 1.3 (H)   ALB: 3.6 TOT PROTEIN: 6.8 LIPASE: 24       Procal: N/A CRP: 118.32 (H) Lactic Acid: N/A         Imaging results reviewed over the past 24 hrs:   Recent Results (from the past 24 hour(s))   CT Abdomen Pelvis w Contrast    Narrative    EXAM: CT ABDOMEN PELVIS W CONTRAST  LOCATION: [facility]  DATE/TIME: 5/1/2023 4:52 PM CDT    INDICATION: RLQ abdominal pain with diarrhea  COMPARISON: None.  TECHNIQUE: CT scan of the abdomen and pelvis was performed following injection of IV contrast. Multiplanar reformats were obtained. Dose reduction techniques were used.  CONTRAST: Isovue 370, 81 mL    FINDINGS:   LOWER CHEST: Normal.    HEPATOBILIARY: Normal.    PANCREAS: Normal.    SPLEEN: Normal.    ADRENAL GLANDS: Normal.    KIDNEYS/BLADDER: Normal.    BOWEL: 1 cm appendicolith present  at base of the appendix. The appendix is fluid-filled, dilated and inflamed consistent with appendicitis. There is prominent surrounding soft tissue stranding especially involving the appendiceal base and cecal tip.   There is questionable loss of integrity of the appendiceal wall near the appendicolith and a mild amount of free fluid, early perforation can't be excluded. There is mild wall thickening and enhancement involving numerous loops of adjacent small bowel.   Trace fluid fluid seen within each gutter as well as free fluid in the pelvis but no localized abscess. No free air evident. No bowel obstruction.    LYMPH NODES: Mild reactive adenopathy in the ileocolic distribution.    VASCULATURE: Unremarkable.    PELVIC ORGANS: Mild free fluid.    MUSCULOSKELETAL: Normal.      Impression    IMPRESSION:   1.  Complicated appendicitis. 1 cm obstructing appendicolith with loss of integrity of the appendiceal wall near the base and concern for early perforation.  2.  Abundant surrounding soft tissue stranding, cecal and adjacent small bowel wall thickening and a mild amount of free ascites.

## 2023-05-02 NOTE — H&P
[facility]    Telehospitalist History and Physical        Date of Admission:  5/1/2023    Chief Complaint : Abdominal pain.    History is obtained from the patient    History of Present Illness : Mr. Collazo is a 28-year-old  male with no significant past medical history who presented to the ER today with a 3 day history of abdominal pain.  He was in an his normal state of health until approximately 2 weeks ago when he developed diarrhea 3 times daily.  He then began having nonradiating generalized abdominal pain 5/10 in severity Friday 4/28/2023.  He denies any nausea, vomiting, fevers, chills, chest pain, shortness of breath. On arrival to the ER he was hypotensive with a blood pressure of 98/41 and his initial lab work was remarkable for a WBC count of 14.2, sodium of 135, CRP of 118.32, and a CO2 of 19.  CT of the abdomen and pelvis showed complicated appendicitis with a 1 cm obstructing appendicolith with loss of integrity of the appendiceal wall near the base and concern for early perforation. He received unasyn 3 g IV, tylenol 975 mg PO, and a 1 L normal saline bolus in the ER and he was taken to the OR for laparoscopic appendectomy and partial cecectomy due to acute appendicitis with perforation and gangrene. He states that his abdominal pain has decreased to 1/10 in severity following surgery and he overall feels better.  He is otherwise in stable condition and he has no other complaints today.      Review of Systems    General: Negative for fever, chills, sweats, weakness  Eyes: Negative for blurred vision, loss of vision  Ear Nose and Throat: negative for pharyngitis, speech or swallowing difficulties  Respiratory:  Negative for sputum production, wheezing, BROOKS, pleuritic pain, sob or cough  Cardiology:  Negative for chest pain, palpitations, orthopnea, PND, edema, syncope   Gastrointestinal: Positive for abdominal pain and diarrhea  Genitourinary: Negative for  frequency, urgency, dysuria, hematuria   Neurological: Negative for focal weakness, paresthesia    Past Medical History    I have reviewed this patient's medical history and updated it with pertinent information if needed.   Past Medical History:   Diagnosis Date     Acute appendicitis with perforation and gangrene 05/01/2023       Past Surgical History   I have reviewed this patient's surgical history and updated it with pertinent information if needed.  Past Surgical History:   Procedure Laterality Date     LAPAROSCOPIC APPENDECTOMY  05/01/2023     VASECTOMY N/A 09/10/2021       Social History   I have reviewed this patient's social history and updated it with pertinent information if needed.  Social History     Tobacco Use     Smoking status: Never     Smokeless tobacco: Never   Vaping Use     Vaping status: Never Used     Passive vaping exposure: Yes   Substance Use Topics     Alcohol use: Yes     Drug use: No       Family History   I have reviewed this patient's family history and updated it with pertinent information if needed.  Family History   Problem Relation Age of Onset     Heart Disease Father      Prior to Admission Medications   None     Allergies   No Known Allergies    Physical Exam   Vital Signs: Temp: 99.1  F (37.3  C) Temp src: Oral BP: 123/75 Pulse: 90   Resp: 18 SpO2: 96 % O2 Device: None (Room air)    Weight: 165 lbs 0 oz    General -  male in no acute distress.  HEENT - NCAT. No scleral icterus. Oropharynx clear. Mucous membranes moist.  Neck - No lymphadenopathy, thyromegaly, or JVD.  CVS - Regular rate and rhythm, No murmurs, rubs, or gallops.  Resp - Lungs are clear to auscultation bilaterally. No rales, wheeze, or rhonchi.   GI - Soft, nontender, nondistended, normoactive bowel sounds present.   Extremities - No clubbing, cyanosis, or edema.   Neuro - Alert and oriented times four.CN II through XII are grossly intact. No focal neurological deficits.   Psych -  Normal affect.  Skin  - No rash, skin tear, or ulcer.     Data     Recent Labs   Lab 05/01/23  1553   WBC 14.2*   HGB 12.9*   MCV 60*      *   POTASSIUM 3.6   CHLORIDE 96*   CO2 19*   BUN 11.4   CR 0.96   ANIONGAP 20*   GRACE 9.3   *   ALBUMIN 4.4   PROTTOTAL 7.9   BILITOTAL 1.8*   ALKPHOS 91   ALT 17   AST 19   LIPASE 24         Recent Results (from the past 24 hour(s))   CT Abdomen Pelvis w Contrast    Narrative    EXAM: CT ABDOMEN PELVIS W CONTRAST  LOCATION: [facility]  DATE/TIME: 5/1/2023 4:52 PM CDT    INDICATION: RLQ abdominal pain with diarrhea  COMPARISON: None.  TECHNIQUE: CT scan of the abdomen and pelvis was performed following injection of IV contrast. Multiplanar reformats were obtained. Dose reduction techniques were used.  CONTRAST: Isovue 370, 81 mL    FINDINGS:   LOWER CHEST: Normal.    HEPATOBILIARY: Normal.    PANCREAS: Normal.    SPLEEN: Normal.    ADRENAL GLANDS: Normal.    KIDNEYS/BLADDER: Normal.    BOWEL: 1 cm appendicolith present at base of the appendix. The appendix is fluid-filled, dilated and inflamed consistent with appendicitis. There is prominent surrounding soft tissue stranding especially involving the appendiceal base and cecal tip.   There is questionable loss of integrity of the appendiceal wall near the appendicolith and a mild amount of free fluid, early perforation can't be excluded. There is mild wall thickening and enhancement involving numerous loops of adjacent small bowel.   Trace fluid fluid seen within each gutter as well as free fluid in the pelvis but no localized abscess. No free air evident. No bowel obstruction.    LYMPH NODES: Mild reactive adenopathy in the ileocolic distribution.    VASCULATURE: Unremarkable.    PELVIC ORGANS: Mild free fluid.    MUSCULOSKELETAL: Normal.      Impression    IMPRESSION:   1.  Complicated appendicitis. 1 cm obstructing appendicolith with loss of integrity of the appendiceal wall near the base and  concern for early perforation.  2.  Abundant surrounding soft tissue stranding, cecal and adjacent small bowel wall thickening and a mild amount of free ascites.        Assessment/Plan   1. Acute appendicitis with perforation and gangrene s/p laparoscopic appendectomy and partial cecectomy POD#0  -Continue routine postoperative care with pain control, IV unasyn, IV fluids, and as needed antiemetics.    2. Hyponatremia  -Continue IV fluids.    3. Prophylaxis  -Lovenox for DVT prophylaxis.    4. Code status  -Full code.    5. Disposition  -Anticipate discharge home in the next 24 hours once cleared by general surgery.    OSMAR REGALADO MD  [facility]  Securely message with the Vocera Web Console (learn more here)  Text page via [facility] Paging/Directory      Visit/Communication Style   Virtual (Video) communication was used to evaluate Darell.  Darell consented to the use of video communication: yes  Video START time: 10:45 pm, 5/1/2023  Video STOP time: 11 pm, 5/1/2023   Patient's location: [facility]   Provider's location during the visit: [place].  His exam was performed with the assistance of Radha Lopez RN.

## 2023-05-02 NOTE — ANESTHESIA PROCEDURE NOTES
Airway       Patient location during procedure: OR       Procedure Start/Stop Times: 5/1/2023 7:43 PM  Staff -        CRNA: Brittany Kauffman APRN CRNA       Performed By: CRNA  Consent for Airway        Urgency: elective  Indications and Patient Condition       Indications for airway management: [place]-procedural       Induction type:intravenous       Mask difficulty assessment: 1 - vent by mask    Final Airway Details       Final airway type: endotracheal airway       Successful airway: ETT - single  Endotracheal Airway Details        ETT size (mm): 7.5       Cuffed: yes       Cuff volume (mL): 8       Successful intubation technique: direct laryngoscopy       DL Blade Type: Weems 2       Grade View of Cords: 1       Adjucts: stylet       Position: Right       Measured from: lips       Secured at (cm): 21       Bite block used: None    Post intubation assessment        Placement verified by: capnometry, equal breath sounds and chest rise        Number of attempts at approach: 1       Number of other approaches attempted: 0       Secured with: plastic tape       Ease of procedure: easy       Dentition: Intact and Unchanged    Medication(s) Administered   Medication Administration Time: 5/1/2023 7:43 PM    Additional Comments       Atraumatic. No apparent complications.

## 2023-05-02 NOTE — CONSULTS
Patient seen in consultation for perforated appendicitis by Nacho He MD [facility] ER    HPI:  Patient is a 28 year old male  with complaints of abdominal pain  The patient noticed the symptoms about 3 days ago.    A little over a week ago had about a week of diarrhea and some generalized abdominal pain that had resolved and felt fairly normal then 3 days ago developed some abdominal pain again.  Started at night and persisted in the afternoon the next day the neck she felt a bit better yesterday but pain started coming back today.  Felt mostly generalized to lower abdomen but after exam in the ER was noticing more in the right lower quadrant.  Had some diarrhea associate with this pain, no nausea or vomiting and no fevers.  Last had food yesterday evening.  Has felt some desire to eat but did not want to end up making anything worse so just had not.  pain medication makes the episode better.    Review Of Systems    As above      History reviewed. No pertinent past medical history.    Past Surgical History:   Procedure Laterality Date     VASECTOMY N/A 09/10/2021       Social History     Socioeconomic History     Marital status:      Spouse name: Not on file     Number of children: 2     Years of education: Not on file     Highest education level: Not on file   Occupational History     Not on file   Tobacco Use     Smoking status: Never     Smokeless tobacco: Never   Vaping Use     Vaping status: Never Used     Passive vaping exposure: Yes   Substance and Sexual Activity     Alcohol use: Yes     Drug use: No     Sexual activity: Yes     Partners: Female     Birth control/protection: Condom, Male Surgical     Comment: vasectomy   Other Topics Concern     Parent/sibling w/ CABG, MI or angioplasty before 65F 55M? Yes   Social History Narrative     Not on file     Social Determinants of Health     Financial Resource Strain: Not on file   Food Insecurity: Not on file   Transportation Needs: Not on file   Physical  Activity: Not on file   Stress: Not on file   Social Connections: Not on file   Intimate Partner Violence: Not on file   Housing Stability: Not on file       No current outpatient medications on file.       Medications and history reviewed    Physical exam:  Vitals: /75   Pulse 90   Temp 99.1  F (37.3  C) (Oral)   Resp 18   Wt 74.8 kg (165 lb)   SpO2 96%   BMI 25.84 kg/m    BMI= Body mass index is 25.84 kg/m .  During exam heart rate was in low 100s    Constitutional: healthy, alert and no distress  Head: Normocephalic. No masses, lesions, tenderness or abnormalities  Cardiovascular: positive findings: tachycardia  Respiratory: negative, Percussion normal. Good diaphragmatic excursion. Lungs clear  Gastrointestinal: positive findings: tenderness moderate and involuntary guarding suprapubic and RLQ.  Some tenderness but less in the left lower quadrant.  No rebound.  : Deferred  Musculoskeletal: extremities normal- no gross deformities noted, gait normal and normal muscle tone  Skin: no suspicious lesions or rashes and warm to touch  Psychiatric: mentation appears normal and affect normal/bright    Labs show:   Latest Reference Range & Units 05/01/23 15:53   Sodium 136 - 145 mmol/L 135 (L)   Potassium 3.4 - 5.3 mmol/L 3.6   Chloride 98 - 107 mmol/L 96 (L)   Carbon Dioxide (CO2) 22 - 29 mmol/L 19 (L)   Urea Nitrogen 6.0 - 20.0 mg/dL 11.4   Creatinine 0.67 - 1.17 mg/dL 0.96   GFR Estimate >60 mL/min/1.73m2 >90   Calcium 8.6 - 10.0 mg/dL 9.3   Anion Gap 7 - 15 mmol/L 20 (H)   Albumin 3.5 - 5.2 g/dL 4.4   Protein Total 6.4 - 8.3 g/dL 7.9   Alkaline Phosphatase 40 - 129 U/L 91   ALT 10 - 50 U/L 17   AST 10 - 50 U/L 19   Bilirubin Direct 0.00 - 0.30 mg/dL 0.36 (H)   Bilirubin Total <=1.2 mg/dL 1.8 (H)   CRP Inflammation <5.00 mg/L 118.32 (H)   Glucose 70 - 99 mg/dL 124 (H)   Lipase 13 - 60 U/L 24   WBC 4.0 - 11.0 10e3/uL 14.2 (H)   Hemoglobin 13.3 - 17.7 g/dL 12.9 (L)   Hematocrit 40.0 - 53.0 % 40.4   Platelet  Count 150 - 450 10e3/uL 238   RBC Count 4.40 - 5.90 10e6/uL 6.70 (H)   MCV 78 - 100 fL 60 (L)   MCH 26.5 - 33.0 pg 19.3 (L)   MCHC 31.5 - 36.5 g/dL 31.9   RDW 10.0 - 15.0 % 17.9 (H)   % Neutrophils % 78   % Lymphocytes % 12   % Monocytes % 9   % Eosinophils % 0   % Basophils % 0   Absolute Basophils 0.0 - 0.2 10e3/uL 0.1   Absolute Eosinophils 0.0 - 0.7 10e3/uL 0.1   Absolute Immature Granulocytes <=0.4 10e3/uL 0.1   Absolute Lymphocytes 0.8 - 5.3 10e3/uL 1.7   Absolute Monocytes 0.0 - 1.3 10e3/uL 1.2   % Immature Granulocytes % 1   Absolute Neutrophils 1.6 - 8.3 10e3/uL 11.1 (H)   Absolute NRBCs 10e3/uL 0.0   NRBCs per 100 WBC <1 /100 0   Sed Rate 0 - 15 mm/hr 18 (H)   (L): Data is abnormally low  (H): Data is abnormally high    Imaging shows:  Personally reviewed    EXAM: CT ABDOMEN PELVIS W CONTRAST  LOCATION: [facility]  DATE/TIME: 5/1/2023 4:52 PM CDT     INDICATION: RLQ abdominal pain with diarrhea  COMPARISON: None.  TECHNIQUE: CT scan of the abdomen and pelvis was performed following injection of IV contrast. Multiplanar reformats were obtained. Dose reduction techniques were used.  CONTRAST: Isovue 370, 81 mL     FINDINGS:   LOWER CHEST: Normal.     HEPATOBILIARY: Normal.     PANCREAS: Normal.     SPLEEN: Normal.     ADRENAL GLANDS: Normal.     KIDNEYS/BLADDER: Normal.     BOWEL: 1 cm appendicolith present at base of the appendix. The appendix is fluid-filled, dilated and inflamed consistent with appendicitis. There is prominent surrounding soft tissue stranding especially involving the appendiceal base and cecal tip.   There is questionable loss of integrity of the appendiceal wall near the appendicolith and a mild amount of free fluid, early perforation can't be excluded. There is mild wall thickening and enhancement involving numerous loops of adjacent small bowel.   Trace fluid fluid seen within each gutter as well as free fluid in the pelvis but no localized abscess. No  free air evident. No bowel obstruction.     LYMPH NODES: Mild reactive adenopathy in the ileocolic distribution.     VASCULATURE: Unremarkable.     PELVIC ORGANS: Mild free fluid.     MUSCULOSKELETAL: Normal.                                                                      IMPRESSION:   1.  Complicated appendicitis. 1 cm obstructing appendicolith with loss of integrity of the appendiceal wall near the base and concern for early perforation.  2.  Abundant surrounding soft tissue stranding, cecal and adjacent small bowel wall thickening and a mild amount of free ascites.    Assessment:     ICD-10-CM    1. Acute appendicitis with perforation and localized peritonitis, without abscess, unspecified whether gangrene present  K35.32 Case Request: APPENDECTOMY, LAPAROSCOPIC     Case Request: APPENDECTOMY, LAPAROSCOPIC        Plan: Acute perforated appendicitis, some free fluid but no organized abscess.  Some surrounding inflammatory change/phlegmon.  Unasyn started in the ER.  Recommended appendectomy.  Discussed risks of bleeding, infection, development of abscess and need for future drainage, damage to surrounding organs or bowel, anesthesia, conversion to open.  Given appearance of perforation on the CT scan will need to admit following surgery for further antibiotic treatment and monitoring.  Hyperbilirubinemia on labs unknown cause or significance at this time, may improve once infectious source controlled and rehydration along with his mildly low sodium, chloride.  Will monitor    Alan Marshall MD

## 2023-05-03 ENCOUNTER — TELEPHONE (OUTPATIENT)
Dept: SURGERY | Facility: CLINIC | Age: 28
End: 2023-05-03
Payer: COMMERCIAL

## 2023-05-03 VITALS
WEIGHT: 165.34 LBS | OXYGEN SATURATION: 97 % | RESPIRATION RATE: 16 BRPM | TEMPERATURE: 98.3 F | HEART RATE: 81 BPM | DIASTOLIC BLOOD PRESSURE: 66 MMHG | SYSTOLIC BLOOD PRESSURE: 114 MMHG | BODY MASS INDEX: 25.9 KG/M2

## 2023-05-03 LAB
ANION GAP SERPL CALCULATED.3IONS-SCNC: 11 MMOL/L (ref 7–15)
BASOPHILS # BLD AUTO: 0 10E3/UL (ref 0–0.2)
BASOPHILS NFR BLD AUTO: 0 %
BUN SERPL-MCNC: 13.6 MG/DL (ref 6–20)
CALCIUM SERPL-MCNC: 8.9 MG/DL (ref 8.6–10)
CHLORIDE SERPL-SCNC: 105 MMOL/L (ref 98–107)
CREAT SERPL-MCNC: 0.77 MG/DL (ref 0.67–1.17)
DEPRECATED HCO3 PLAS-SCNC: 23 MMOL/L (ref 22–29)
EOSINOPHIL # BLD AUTO: 0.1 10E3/UL (ref 0–0.7)
EOSINOPHIL NFR BLD AUTO: 1 %
ERYTHROCYTE [DISTWIDTH] IN BLOOD BY AUTOMATED COUNT: 16.6 % (ref 10–15)
GFR SERPL CREATININE-BSD FRML MDRD: >90 ML/MIN/1.73M2
GLUCOSE SERPL-MCNC: 92 MG/DL (ref 70–99)
HBA1C MFR BLD: 4.7 %
HCT VFR BLD AUTO: 36.1 % (ref 40–53)
HGB BLD-MCNC: 11.2 G/DL (ref 13.3–17.7)
IMM GRANULOCYTES # BLD: 0 10E3/UL
IMM GRANULOCYTES NFR BLD: 0 %
LYMPHOCYTES # BLD AUTO: 0.5 10E3/UL (ref 0.8–5.3)
LYMPHOCYTES NFR BLD AUTO: 8 %
MCH RBC QN AUTO: 18.8 PG (ref 26.5–33)
MCHC RBC AUTO-ENTMCNC: 31 G/DL (ref 31.5–36.5)
MCV RBC AUTO: 61 FL (ref 78–100)
MONOCYTES # BLD AUTO: 0.5 10E3/UL (ref 0–1.3)
MONOCYTES NFR BLD AUTO: 8 %
NEUTROPHILS # BLD AUTO: 5.8 10E3/UL (ref 1.6–8.3)
NEUTROPHILS NFR BLD AUTO: 83 %
NRBC # BLD AUTO: 0 10E3/UL
NRBC BLD AUTO-RTO: 0 /100
PATH REPORT.COMMENTS IMP SPEC: NORMAL
PATH REPORT.FINAL DX SPEC: NORMAL
PATH REPORT.GROSS SPEC: NORMAL
PATH REPORT.MICROSCOPIC SPEC OTHER STN: NORMAL
PATH REPORT.RELEVANT HX SPEC: NORMAL
PHOTO IMAGE: NORMAL
PLATELET # BLD AUTO: 235 10E3/UL (ref 150–450)
POTASSIUM SERPL-SCNC: 3.9 MMOL/L (ref 3.4–5.3)
RBC # BLD AUTO: 5.95 10E6/UL (ref 4.4–5.9)
SODIUM SERPL-SCNC: 139 MMOL/L (ref 136–145)
WBC # BLD AUTO: 7 10E3/UL (ref 4–11)

## 2023-05-03 PROCEDURE — 250N000011 HC RX IP 250 OP 636: Performed by: SURGERY

## 2023-05-03 PROCEDURE — 83036 HEMOGLOBIN GLYCOSYLATED A1C: CPT | Performed by: NURSE PRACTITIONER

## 2023-05-03 PROCEDURE — 99232 SBSQ HOSP IP/OBS MODERATE 35: CPT | Performed by: NURSE PRACTITIONER

## 2023-05-03 PROCEDURE — 250N000013 HC RX MED GY IP 250 OP 250 PS 637: Performed by: SURGERY

## 2023-05-03 PROCEDURE — 258N000003 HC RX IP 258 OP 636: Performed by: INTERNAL MEDICINE

## 2023-05-03 PROCEDURE — 36415 COLL VENOUS BLD VENIPUNCTURE: CPT | Performed by: NURSE PRACTITIONER

## 2023-05-03 PROCEDURE — 80048 BASIC METABOLIC PNL TOTAL CA: CPT | Performed by: NURSE PRACTITIONER

## 2023-05-03 PROCEDURE — 85025 COMPLETE CBC W/AUTO DIFF WBC: CPT | Performed by: SURGERY

## 2023-05-03 RX ORDER — HYDROCODONE BITARTRATE AND ACETAMINOPHEN 5; 325 MG/1; MG/1
1 TABLET ORAL EVERY 6 HOURS PRN
Qty: 12 TABLET | Refills: 0 | Status: SHIPPED | OUTPATIENT
Start: 2023-05-03 | End: 2023-11-03

## 2023-05-03 RX ADMIN — AMPICILLIN AND SULBACTAM 3 G: 2; 1 INJECTION, POWDER, FOR SOLUTION INTRAVENOUS at 05:39

## 2023-05-03 RX ADMIN — AMOXICILLIN AND CLAVULANATE POTASSIUM 1 TABLET: 875; 125 TABLET, FILM COATED ORAL at 09:38

## 2023-05-03 RX ADMIN — ACETAMINOPHEN 975 MG: 325 TABLET ORAL at 03:10

## 2023-05-03 RX ADMIN — KETOROLAC TROMETHAMINE 15 MG: 15 INJECTION, SOLUTION INTRAMUSCULAR; INTRAVENOUS at 03:10

## 2023-05-03 RX ADMIN — SODIUM CHLORIDE: 9 INJECTION, SOLUTION INTRAVENOUS at 06:30

## 2023-05-03 RX ADMIN — ACETAMINOPHEN 975 MG: 325 TABLET ORAL at 12:34

## 2023-05-03 RX ADMIN — SENNOSIDES AND DOCUSATE SODIUM 1 TABLET: 8.6; 5 TABLET ORAL at 09:38

## 2023-05-03 ASSESSMENT — ACTIVITIES OF DAILY LIVING (ADL)
ADLS_ACUITY_SCORE: 18

## 2023-05-03 NOTE — TELEPHONE ENCOUNTER
[facility] Call Center    Phone Message    May a detailed message be left on voicemail: yes     Reason for Call: Nicolle from [facility] said that Pt needs follow up with Dr. Marshall in 7-10 days for surgical follow up.  No appointments available in that time frame.  Please call Pt back to schedule.  Thanks.    Action Taken: Message routed to:  Adult Clinics: Surgery, General p [phone]    Travel Screening: Not Applicable

## 2023-05-03 NOTE — PLAN OF CARE
Goal Outcome Evaluation:      Plan of Care Reviewed With: patient    Overall Patient Progress: improvingOverall Patient Progress: improving    Outcome Evaluation: A&Ox4. VSS on RA. Pain at a max of 3/10. Scheduled tylenol and toradol given, PRN 5mg oxy x1. NS continues at 100 mL/hr. IV unsasyn. Clear liquid diet continued, also. Bloated and slightly distended, therefore did not advance diet further. Ambulated halls x3 today with family members. Independently ambulates. BMx1 today, voiding adequately.

## 2023-05-03 NOTE — PLAN OF CARE
"Goal Outcome Evaluation:      Plan of Care Reviewed With: patient    Overall Patient Progress: improvingOverall Patient Progress: improving    Outcome Evaluation: Pt continues to be vitally stable on RA. Reports slight bloating and abdominal \"annoyance\" rather than pain. Tolerated toast and stuart crackers for lunch. Has ambulated the halls several times. Anothe BMx1 this AM. Denies any nausea since before shift change. Will follow up with Dr. Aubree rodríguez, the office will call to schedule appt. Pain has been managed with scheduled tylenol this shift.     S-(situation): Patient discharged to home via wheelchair with wife.     B-(background): Ruptured appendectomy     A-(assessment): See above.     R-(recommendations): Discharge instructions reviewed with pt. Listed belongings gathered and returned to patient.          Discharge Nursing Criteria:     Care Plan and Patient education resolved: Yes    New Medications- pt has been educated about purpose and side effects: Yes    Vaccines  Influenza status verified at discharge:  Not Applicable    MISC  Prescriptions if needed, hard copies sent with patient  NA  Home medications returned to patient: NA  Medication Bin checked and emptied on discharge Yes  Patient reports post-discharge pain management plan is effective: Yes    "

## 2023-05-03 NOTE — PROGRESS NOTES
[facility] Note         Assessment and Plan:    Assessment:   Post-operative day #2  Procedure(s):  APPENDECTOMY and Partial Cecectomy, LAPAROSCOPIC  Doing well.  No immediate surgical complications identified.  Pain well-controlled.  WBC 7.0       Plan:   Tolerating clear liquids well - Patient has not advanced diet  Continue frequent ambulation to reduce gas/bloating  Medications have been changed to oral  Continue antibiotics for an additional 2-3 days  Attempt solid food before discharge  Follow up Dr. Marshall 2 weeks   Discharge today           Interval History:     Doing well.  Continues to improve.  Pain is well-controlled.  No fevers.  Patient has been ambulating.  Patient reported some nausea earlier this am not associated with eating but this was resolved with beltching.  Has had gas and loose stool x 2.  He reports decrease sensation of fullness in abdomen.              Physical Exam:   All vitals have been reviewed  Patient Vitals for the past 8 hrs:   BP Temp Temp src Pulse Resp SpO2   05/03/23 0708 114/66 98.3  F (36.8  C) Oral 81 16 97 %     I/O last 3 completed shifts:  In: 2977.33 [P.O.:290; I.V.:2687.33]  Out: 750 [Urine:750]  # Pain Assessment:      5/3/2023     4:00 AM   Current Pain Score   Patient currently in pain? denies   - Darell is experiencing pain due to post operative pain. Pain management was discussed and the plan was created in a collaborative fashion.  Darell's response to the current recommendations: compliant  - Opioid regimen: oxycodone switched to norco  - Response to opioid medications: Reduction of symptoms    - Bowel regimen: senna and docusate  - Pharmacologic adjuvants: Acetaminophen    Incisions clean and dry with no drainage.  Skin glue intact.  Surrounding skin with minimal erythema.  Some bloating present visually though abdomen is soft.  Patient with some tenderness with palpation.            Data:   All laboratory/imaging data related to  this surgery reviewed  Results for orders placed or performed during the hospital encounter of 05/01/23 (from the past 24 hour(s))   CBC with platelets differential    Narrative    The following orders were created for panel order CBC with platelets differential.  Procedure                               Abnormality         Status                     ---------                               -----------         ------                     CBC with platelets and d...[[id number]]  Abnormal            Final result                 Please view results for these tests on the individual orders.   Hemoglobin A1c   Result Value Ref Range    Hemoglobin A1C 4.7 <5.7 %   Basic metabolic panel   Result Value Ref Range    Sodium 139 136 - 145 mmol/L    Potassium 3.9 3.4 - 5.3 mmol/L    Chloride 105 98 - 107 mmol/L    Carbon Dioxide (CO2) 23 22 - 29 mmol/L    Anion Gap 11 7 - 15 mmol/L    Urea Nitrogen 13.6 6.0 - 20.0 mg/dL    Creatinine 0.77 0.67 - 1.17 mg/dL    Calcium 8.9 8.6 - 10.0 mg/dL    Glucose 92 70 - 99 mg/dL    GFR Estimate >90 >60 mL/min/1.73m2   CBC with platelets and differential   Result Value Ref Range    WBC Count 7.0 4.0 - 11.0 10e3/uL    RBC Count 5.95 (H) 4.40 - 5.90 10e6/uL    Hemoglobin 11.2 (L) 13.3 - 17.7 g/dL    Hematocrit 36.1 (L) 40.0 - 53.0 %    MCV 61 (L) 78 - 100 fL    MCH 18.8 (L) 26.5 - 33.0 pg    MCHC 31.0 (L) 31.5 - 36.5 g/dL    RDW 16.6 (H) 10.0 - 15.0 %    Platelet Count 235 150 - 450 10e3/uL    % Neutrophils 83 %    % Lymphocytes 8 %    % Monocytes 8 %    % Eosinophils 1 %    % Basophils 0 %    % Immature Granulocytes 0 %    NRBCs per 100 WBC 0 <1 /100    Absolute Neutrophils 5.8 1.6 - 8.3 10e3/uL    Absolute Lymphocytes 0.5 (L) 0.8 - 5.3 10e3/uL    Absolute Monocytes 0.5 0.0 - 1.3 10e3/uL    Absolute Eosinophils 0.1 0.0 - 0.7 10e3/uL    Absolute Basophils 0.0 0.0 - 0.2 10e3/uL    Absolute Immature Granulocytes 0.0 <=0.4 10e3/uL    Absolute NRBCs 0.0 10e3/uL        Genet Sarmiento PA-C

## 2023-05-03 NOTE — PLAN OF CARE
Goal Outcome Evaluation:      Plan of Care Reviewed With: patient    Overall Patient Progress: improvingOverall Patient Progress: improving    Outcome Evaluation: Pt A&Ox4. VSS on room air. Pt reports mild abdominal pain, not requiring prn pain medications. Abdomen soft. Surgical LAP incisions x3, approximated with no drainage. IV NS infusing. Pt ambulating independently in room. Pt tolerating oral intake. Pt reports intermittent nausea for a short period this AM, no antiemetic needed, Pt reported nausea passed quickly. Pt reports BM x1 this AM. POD#2 BG -92

## 2023-05-03 NOTE — PLAN OF CARE
Goal Outcome Evaluation:      Plan of Care Reviewed With: patient    Overall Patient Progress: improvingOverall Patient Progress: improving    Outcome Evaluation: Pt is A&Ox4. VSS and afebrile. Pt has reported pain as mild to absent so far this shift. Pt reports that pain has been well controlled. Pt is tolerating clear liquids and denies nausea. Diet advanced for tomorrow to full liquids. Pt is up IND and is steady. Pt has active bowel sounds, passing flatus and pt reports having a bowel movement today. Incisions has liquid bandaid in place, bruising noted around incision. No drainage or redness noted.  IV fluids infusing.       acont to complain of pain just above right upper quadrant region over the lower ribs.  Orders received and pt medicated with morphine

## 2023-05-03 NOTE — PROGRESS NOTES
[facility]    Medicine Progress Note - Hospitalist Service    Date of Admission:  5/1/2023    Assessment & Plan   Appendicitis with perforation S/P Laparoscopic appendectomy and partial cecectomy-gangrenous cecum POD#2  Pain is controlled PRN Oxycodone   Having mild sensation of fullness. Passsing gas and had BMs  Encourage ambulation       Leukocytosis:   Down trending 14.2> 12.0> 7.0          Hyperbilirubinemia   Down trending  T Bili 1.8> 1.3      Hyperglycemia-No hx of DM Suspect secondary to appendicitis and surgery  AM blood sugar 92 mg%   Check A1C and BS in AM   A1C was 4.7  Discussed A1C test, venous blood sugar results with implications. I recommend no follow up as his venous blood sugar and A1C are normal.         Pt can dismiss today from Hospitalist perspective             Diet: Advance Diet as Tolerated: Full Liquid Diet    DVT Prophylaxis: Pneumatic Compression Devices  Hartmann Catheter: Not present  Lines: None     Cardiac Monitoring: None  Code Status: Full Code      Clinically Significant Risk Factors             # Anion Gap Metabolic Acidosis: Highest Anion Gap = 20 mmol/L in last 2 days, will monitor and treat as appropriate                    Disposition Plan     Expected Discharge Date: 05/03/2023               Dismiss to home today   The patient's care was discussed with the Attending Physician, Dr. Hamm  and Patient.    ENRRIQUE Harris [facility]  Hospitalist Service  [facility]  Securely message with [employer] (more info)  Text page via [facility] Paging/Directory   ______________________________________________________________________    Interval History   Reviewed overnight notes. No events     Physical Exam   Vital Signs: Temp: 98.3  F (36.8  C) Temp src: Oral BP: 114/66 Pulse: 81   Resp: 16 SpO2: 97 % O2 Device: None (Room air)    Weight: 165 lbs 5.52 oz    Constitutional: 29 yo male in bed, on RA and not in distress.   Eyes: sclera  clear and conjunctiva normal  Hematologic / Lymphatic: No bleeding or bruising   Respiratory: No increased work of breathing, good air exchange, clear to auscultation bilaterally, no crackles or wheezing  Cardiovascular: Normal apical impulse, regular rate and rhythm, normal S1 and S2, no S3 or S4, and no murmur noted  GI: normal bowel sounds, soft, non-distended and mild tenderness at incision site   Skin: no bruising or bleeding, normal skin color, texture, turgor, no redness, warmth, or swelling and no rashes  Musculoskeletal: no lower extremity pitting edema present  Neurologic: A&Ox4, PAINTER, No focal deficits   Neuropsychiatric: General: normal, calm and normal eye contact  Level of consciousness: alert / normal  Affect: normal  Orientation: oriented to self, place, time and situation  Memory and insight: normal, memory for past and recent events intact and thought process normal    Medical Decision Making       45 MINUTES SPENT BY ME on the date of service doing chart review, history, exam, documentation & further activities per the note.      Data     I have personally reviewed the following data over the past 24 hrs:    7.0  \   11.2 (L)   / 235     139 105 13.6 /  92   3.9 23 0.77 \       TSH: N/A T4: N/A A1C: 4.7       Imaging results reviewed over the past 24 hrs:   No results found for this or any previous visit (from the past 24 hour(s)).

## 2023-05-04 NOTE — TELEPHONE ENCOUNTER
Per provider ok for 5/22 appt. Scheduled virtual appt and left patient a message with appointment information and call back number with any questions.    Beatrice GORDON, Specialty RN 5/4/2023 11:35 AM

## 2023-05-22 ENCOUNTER — VIRTUAL VISIT (OUTPATIENT)
Dept: SURGERY | Facility: CLINIC | Age: 28
End: 2023-05-22
Payer: COMMERCIAL

## 2023-05-22 DIAGNOSIS — Z90.49 S/P LAPAROSCOPIC APPENDECTOMY: Primary | ICD-10-CM

## 2023-05-22 PROCEDURE — 99024 POSTOP FOLLOW-UP VISIT: CPT | Mod: 93 | Performed by: SURGERY

## 2023-05-22 NOTE — TELEPHONE ENCOUNTER
Patient seen via virtual appointment today 5/22/23. Closing encounter.    Beatrice GORDON, Specialty RN 5/22/2023 1:17 PM

## 2023-05-22 NOTE — PROGRESS NOTES
General Surgery Post Op    Pt returns for follow up visit s/p laparoscopic appendectomy and partial cecectomy for perforated gangrenous appendix on 5/1/2023.  Telephone visit    Patient has been doing well, tolerating diet. Bowels moving well. Pain controlled. No issues with wound healing/redness/drainage. No fevers.  This last weekend was may be doing a little bit more and felt some soreness/discomfort in the suprapubic port site.  Wound otherwise appears to be healing well.  No other concerns.    Physical exam: Vitals: There were no vitals taken for this visit.  BMI= There is no height or weight on file to calculate BMI.      Path:  Final Diagnosis   Appendix, appendectomy:  -Acute suppurative and gangrenous appendicitis with serositis          Assessment:     ICD-10-CM    1. S/P laparoscopic appendectomy  Z90.49       + partial cecectomy    Plan: Overall recovering well, suspect this suprapubic soreness is activity related as no other abnormalities with wound healing have been noted so far but advised that if this is worsening or ongoing with more time this can be reevaluated in person in clinic either with the general surgeons at [facility] or primary care if he is unable to see me here in the [facility].  Okay to start increasing activities as tolerated.  Follow-up as needed.    Alan Marshall MD

## 2023-05-22 NOTE — LETTER
5/22/2023         RE: Darell Collazo  [address] [facility] [address]        Dear Colleague,    Thank you for referring your patient, Darell Collazo, to the [facility]. Please see a copy of my visit note below.    General Surgery Post Op    Pt returns for follow up visit s/p laparoscopic appendectomy and partial cecectomy for perforated gangrenous appendix on 5/1/2023.  Telephone visit    Patient has been doing well, tolerating diet. Bowels moving well. Pain controlled. No issues with wound healing/redness/drainage. No fevers.  This last weekend was may be doing a little bit more and felt some soreness/discomfort in the suprapubic port site.  Wound otherwise appears to be healing well.  No other concerns.    Physical exam: Vitals: There were no vitals taken for this visit.  BMI= There is no height or weight on file to calculate BMI.      Path:  Final Diagnosis   Appendix, appendectomy:  -Acute suppurative and gangrenous appendicitis with serositis          Assessment:     ICD-10-CM    1. S/P laparoscopic appendectomy  Z90.49       + partial cecectomy    Plan: Overall recovering well, suspect this suprapubic soreness is activity related as no other abnormalities with wound healing have been noted so far but advised that if this is worsening or ongoing with more time this can be reevaluated in person in clinic either with the general surgeons at [facility] or primary care if he is unable to see me here in the [facility].  Okay to start increasing activities as tolerated.  Follow-up as needed.    Alan Marshall MD          Again, thank you for allowing me to participate in the care of your patient.        Sincerely,        Alan Marshall MD

## 2023-09-26 ENCOUNTER — LAB (OUTPATIENT)
Dept: LAB | Facility: CLINIC | Age: 28
End: 2023-09-26
Payer: COMMERCIAL

## 2023-09-26 DIAGNOSIS — Z30.2 ENCOUNTER FOR VASECTOMY: ICD-10-CM

## 2023-09-26 DIAGNOSIS — Z30.2 ENCOUNTER FOR VASECTOMY: Primary | ICD-10-CM

## 2023-09-26 LAB
SPERM MOTILE SMN QL MICRO: NORMAL
SPERM P VAS SMN QL MICRO: NORMAL

## 2023-09-26 PROCEDURE — 89321 SEMEN ANAL SPERM DETECTION: CPT

## 2023-10-06 DIAGNOSIS — Z30.2 ENCOUNTER FOR VASECTOMY: Primary | ICD-10-CM

## 2023-10-06 NOTE — RESULT ENCOUNTER NOTE
Da,  Your results are interesting given that your specimen was negative a couple of years ago.  I recommend submitting another specimen to our lab in a month to see if this was a true result.  Please let me know if you have any questions.    Sincerely,  Dr. Taveras

## 2023-10-27 ENCOUNTER — MYC MEDICAL ADVICE (OUTPATIENT)
Dept: FAMILY MEDICINE | Facility: CLINIC | Age: 28
End: 2023-10-27
Payer: COMMERCIAL

## 2023-10-27 ASSESSMENT — ENCOUNTER SYMPTOMS
ARTHRALGIAS: 0
FREQUENCY: 0
HEMATURIA: 0
NERVOUS/ANXIOUS: 0
NAUSEA: 0
DIARRHEA: 0
JOINT SWELLING: 0
SORE THROAT: 0
ABDOMINAL PAIN: 0
FEVER: 0
PARESTHESIAS: 0
WEAKNESS: 0
SHORTNESS OF BREATH: 0
DIZZINESS: 0
CHILLS: 0
COUGH: 0
HEADACHES: 0
DYSURIA: 0
MYALGIAS: 0
CONSTIPATION: 0
HEARTBURN: 0
EYE PAIN: 0
HEMATOCHEZIA: 0
PALPITATIONS: 0

## 2023-10-30 ENCOUNTER — LAB (OUTPATIENT)
Dept: LAB | Facility: CLINIC | Age: 28
End: 2023-10-30
Payer: COMMERCIAL

## 2023-10-30 DIAGNOSIS — Z30.2 ENCOUNTER FOR VASECTOMY: ICD-10-CM

## 2023-10-30 LAB
SPERM MOTILE SMN QL MICRO: NORMAL
SPERM P VAS SMN QL MICRO: NORMAL

## 2023-10-30 PROCEDURE — 89321 SEMEN ANAL SPERM DETECTION: CPT

## 2023-11-03 ENCOUNTER — OFFICE VISIT (OUTPATIENT)
Dept: INTERNAL MEDICINE | Facility: CLINIC | Age: 28
End: 2023-11-03
Payer: COMMERCIAL

## 2023-11-03 VITALS
DIASTOLIC BLOOD PRESSURE: 60 MMHG | SYSTOLIC BLOOD PRESSURE: 110 MMHG | TEMPERATURE: 97.7 F | WEIGHT: 166.1 LBS | RESPIRATION RATE: 14 BRPM | BODY MASS INDEX: 26.01 KG/M2 | OXYGEN SATURATION: 98 % | HEART RATE: 60 BPM

## 2023-11-03 DIAGNOSIS — Z00.00 ROUTINE GENERAL MEDICAL EXAMINATION AT A HEALTH CARE FACILITY: ICD-10-CM

## 2023-11-03 DIAGNOSIS — Z11.59 NEED FOR HEPATITIS C SCREENING TEST: Primary | ICD-10-CM

## 2023-11-03 LAB
ALBUMIN UR-MCNC: NEGATIVE MG/DL
ANION GAP SERPL CALCULATED.3IONS-SCNC: 8 MMOL/L (ref 7–15)
APPEARANCE UR: CLEAR
BILIRUB UR QL STRIP: NEGATIVE
BUN SERPL-MCNC: 17 MG/DL (ref 6–20)
CALCIUM SERPL-MCNC: 9 MG/DL (ref 8.6–10)
CHLORIDE SERPL-SCNC: 105 MMOL/L (ref 98–107)
CHOLEST SERPL-MCNC: 138 MG/DL
COLOR UR AUTO: YELLOW
CREAT SERPL-MCNC: 0.93 MG/DL (ref 0.67–1.17)
DEPRECATED HCO3 PLAS-SCNC: 28 MMOL/L (ref 22–29)
EGFRCR SERPLBLD CKD-EPI 2021: >90 ML/MIN/1.73M2
ERYTHROCYTE [DISTWIDTH] IN BLOOD BY AUTOMATED COUNT: 18 % (ref 10–15)
GLUCOSE SERPL-MCNC: 97 MG/DL (ref 70–99)
GLUCOSE UR STRIP-MCNC: NEGATIVE MG/DL
HCT VFR BLD AUTO: 40.4 % (ref 40–53)
HCV AB SERPL QL IA: NONREACTIVE
HDLC SERPL-MCNC: 51 MG/DL
HGB BLD-MCNC: 12.4 G/DL (ref 13.3–17.7)
HGB UR QL STRIP: NEGATIVE
KETONES UR STRIP-MCNC: NEGATIVE MG/DL
LDLC SERPL CALC-MCNC: 76 MG/DL
LEUKOCYTE ESTERASE UR QL STRIP: NEGATIVE
MCH RBC QN AUTO: 19.1 PG (ref 26.5–33)
MCHC RBC AUTO-ENTMCNC: 30.7 G/DL (ref 31.5–36.5)
MCV RBC AUTO: 62 FL (ref 78–100)
NITRATE UR QL: NEGATIVE
NONHDLC SERPL-MCNC: 87 MG/DL
PH UR STRIP: 6 [PH] (ref 5–7)
PLATELET # BLD AUTO: 219 10E3/UL (ref 150–450)
POTASSIUM SERPL-SCNC: 4.4 MMOL/L (ref 3.4–5.3)
RBC # BLD AUTO: 6.49 10E6/UL (ref 4.4–5.9)
SODIUM SERPL-SCNC: 141 MMOL/L (ref 135–145)
SP GR UR STRIP: 1.02 (ref 1–1.03)
TRIGL SERPL-MCNC: 55 MG/DL
UROBILINOGEN UR STRIP-MCNC: NORMAL MG/DL
WBC # BLD AUTO: 5.8 10E3/UL (ref 4–11)

## 2023-11-03 PROCEDURE — 86803 HEPATITIS C AB TEST: CPT | Performed by: INTERNAL MEDICINE

## 2023-11-03 PROCEDURE — 80048 BASIC METABOLIC PNL TOTAL CA: CPT | Performed by: INTERNAL MEDICINE

## 2023-11-03 PROCEDURE — 90471 IMMUNIZATION ADMIN: CPT | Performed by: INTERNAL MEDICINE

## 2023-11-03 PROCEDURE — 36415 COLL VENOUS BLD VENIPUNCTURE: CPT | Performed by: INTERNAL MEDICINE

## 2023-11-03 PROCEDURE — 99395 PREV VISIT EST AGE 18-39: CPT | Mod: 25 | Performed by: INTERNAL MEDICINE

## 2023-11-03 PROCEDURE — 80061 LIPID PANEL: CPT | Performed by: INTERNAL MEDICINE

## 2023-11-03 PROCEDURE — 90686 IIV4 VACC NO PRSV 0.5 ML IM: CPT | Performed by: INTERNAL MEDICINE

## 2023-11-03 PROCEDURE — 81003 URINALYSIS AUTO W/O SCOPE: CPT | Performed by: INTERNAL MEDICINE

## 2023-11-03 PROCEDURE — 85027 COMPLETE CBC AUTOMATED: CPT | Performed by: INTERNAL MEDICINE

## 2023-11-03 ASSESSMENT — ENCOUNTER SYMPTOMS
CONSTIPATION: 0
SORE THROAT: 0
FREQUENCY: 0
PALPITATIONS: 0
ABDOMINAL PAIN: 0
DIARRHEA: 0
NAUSEA: 0
DIZZINESS: 0
HEADACHES: 0
COUGH: 0
JOINT SWELLING: 0
NERVOUS/ANXIOUS: 0
PARESTHESIAS: 0
HEMATOCHEZIA: 0
HEMATURIA: 0
ARTHRALGIAS: 0
MYALGIAS: 0
HEARTBURN: 0
EYE PAIN: 0
DYSURIA: 0
CHILLS: 0
SHORTNESS OF BREATH: 0
WEAKNESS: 0
FEVER: 0

## 2023-11-03 NOTE — PROGRESS NOTES
"SUBJECTIVE:   CC: Da is an 28 year old who presents for preventative health visit.       11/3/2023     8:06 AM   Additional Questions   Roomed by Bernadine cline       Healthy Habits:     Getting at least 3 servings of Calcium per day:  Yes    Bi-annual eye exam:  Yes    Dental care twice a year:  Yes    Sleep apnea or symptoms of sleep apnea:  None    Diet:  Regular (no restrictions)    Frequency of exercise:  2-3 days/week    Duration of exercise:  15-30 minutes    Taking medications regularly:  Yes    Barriers to taking medications:  None    Medication side effects:  None    Additional concerns today:  Yes      Today's PHQ-2 Score:       11/3/2023     8:05 AM   PHQ-2 ( 1999 [employer])   Q1: Little interest or pleasure in doing things 0   Q2: Feeling down, depressed or hopeless 0   PHQ-2 Score 0   Q1: Little interest or pleasure in doing things Not at all   Q2: Feeling down, depressed or hopeless Not at all   PHQ-2 Score 0                 -------------------------------------      Social History     Tobacco Use    Smoking status: Never    Smokeless tobacco: Never   Substance Use Topics    Alcohol use: Yes             10/27/2023     9:55 AM   Alcohol Use   Prescreen: >3 drinks/day or >7 drinks/week? No       Last PSA: No results found for: \"PSA\"    Reviewed orders with patient. Reviewed health maintenance and updated orders accordingly - Yes  Lab work is in process  Labs reviewed in EPIC  BP Readings from Last 3 Encounters:   11/03/23 110/60   05/03/23 114/66   09/29/22 108/72    Wt Readings from Last 3 Encounters:   11/03/23 75.3 kg (166 lb 1.6 oz)   05/01/23 75 kg (165 lb 5.5 oz)   09/29/22 77.7 kg (171 lb 3.2 oz)                  Patient Active Problem List   Diagnosis    Acute appendicitis with perforation and localized peritonitis, without abscess, unspecified whether gangrene present     Past Surgical History:   Procedure Laterality Date    LAPAROSCOPIC APPENDECTOMY  05/01/2023    LAPAROSCOPIC APPENDECTOMY N/A " 5/1/2023    Procedure: APPENDECTOMY and Partial Cecectomy, LAPAROSCOPIC;  Surgeon: Alan Marshall MD;  Location: [facility] OR    VASECTOMY N/A 09/10/2021       Social History     Tobacco Use    Smoking status: Never    Smokeless tobacco: Never   Substance Use Topics    Alcohol use: Yes     Family History   Problem Relation Age of Onset    Heart Disease Father          No current outpatient medications on file.     No Known Allergies    Reviewed and updated as needed this visit by clinical staff   Tobacco  Allergies  Meds              Reviewed and updated as needed this visit by Provider                 Past Medical History:   Diagnosis Date    Acute appendicitis with perforation and gangrene 05/01/2023      Past Surgical History:   Procedure Laterality Date    LAPAROSCOPIC APPENDECTOMY  05/01/2023    LAPAROSCOPIC APPENDECTOMY N/A 5/1/2023    Procedure: APPENDECTOMY and Partial Cecectomy, LAPAROSCOPIC;  Surgeon: Alan Marshall MD;  Location: [facility] OR    VASECTOMY N/A 09/10/2021       Review of Systems   Constitutional:  Negative for chills and fever.   HENT:  Negative for congestion, ear pain, hearing loss and sore throat.    Eyes:  Negative for pain and visual disturbance.   Respiratory:  Negative for cough and shortness of breath.    Cardiovascular:  Negative for chest pain, palpitations and peripheral edema.   Gastrointestinal:  Negative for abdominal pain, constipation, diarrhea, heartburn, hematochezia and nausea.   Genitourinary:  Negative for dysuria, frequency, genital sores, hematuria, impotence, penile discharge and urgency.   Musculoskeletal:  Negative for arthralgias, joint swelling and myalgias.   Skin:  Negative for rash.   Neurological:  Negative for dizziness, weakness, headaches and paresthesias.   Psychiatric/Behavioral:  Negative for mood changes. The patient is not nervous/anxious.          OBJECTIVE:   /60   Pulse 60   Temp 97.7  F (36.5  C)   Resp 14   Wt 75.3 kg (166 lb 1.6  oz)   SpO2 98%   BMI 26.01 kg/m      Physical Exam  GENERAL: healthy, alert and no distress  EYES: Eyes grossly normal to inspection, PERRL and conjunctivae and sclerae normal  HENT: ear canals and TM's normal, nose and mouth without ulcers or lesions  NECK: no adenopathy, no asymmetry, masses, or scars and thyroid normal to palpation  RESP: lungs clear to auscultation - no rales, rhonchi or wheezes  CV: regular rate and rhythm, normal S1 S2, no S3 or S4, no murmur, click or rub, no peripheral edema and peripheral pulses strong  ABDOMEN: soft, nontender, no hepatosplenomegaly, no masses and bowel sounds normal  MS: no gross musculoskeletal defects noted, no edema  SKIN: no suspicious lesions or rashes and several small benign-appearing nevi on back.  NEURO: Normal strength and tone, mentation intact and speech normal  PSYCH: mentation appears normal, affect normal/bright    Diagnostic Test Results:  Labs reviewed in Epic  No results found for this or any previous visit (from the past 24 hour(s)).    ASSESSMENT/PLAN:       ICD-10-CM    1. Need for hepatitis C screening test  Z11.59 Hepatitis C Screen Reflex to HCV RNA Quant and Genotype     Hepatitis C Screen Reflex to HCV RNA Quant and Genotype      2. Routine general medical examination at a health care facility  Z00.00 INFLUENZA VACCINE IM > 6 MONTHS VALENT IIV4 (AFLURIA/FLUZONE)     REVIEW OF HEALTH MAINTENANCE PROTOCOL ORDERS     CBC with platelets     Lipid panel reflex to direct LDL Fasting     Basic metabolic panel  (Ca, Cl, CO2, Creat, Gluc, K, Na, BUN)     UA Macroscopic with reflex to Microscopic and Culture - Lab Collect     CBC with platelets     Lipid panel reflex to direct LDL Fasting     Basic metabolic panel  (Ca, Cl, CO2, Creat, Gluc, K, Na, BUN)     UA Macroscopic with reflex to Microscopic and Culture - Lab Collect          Patient has been advised of split billing requirements and indicates understanding: Yes      COUNSELING:   Reviewed  preventive health counseling, as reflected in patient instructions       Regular exercise       Healthy diet/nutrition       Vision screening       Hearing screening        I have reviewed the patient's vaccination schedule and discussed the benefits of prophylactic vaccination in detail.  I recommend the patient contact their pharmacist for vaccinations.  Discussed that most insurance companies now favor reimbursement to the pharmacies and it will financially behoove the patient to have vaccinations performed at their pharmacy.        He reports that he has never smoked. He has never used smokeless tobacco.          Sarath Henson DO  [facility]

## 2023-11-06 NOTE — RESULT ENCOUNTER NOTE
Da,  Your results show this test was negative.  Not sure what to think on the test in September.  Your post-vasectomy specimen is good and you are fine with regards to sterility.  I am going to have my lab team look into what happened with your September specimen.  Thanks for double checking with this last specimen.  Please let me know if you have any questions.    Sincerely,  Dr. Taveras

## 2023-11-13 ENCOUNTER — MYC MEDICAL ADVICE (OUTPATIENT)
Dept: INTERNAL MEDICINE | Facility: CLINIC | Age: 28
End: 2023-11-13
Payer: COMMERCIAL

## 2023-11-20 DIAGNOSIS — R71.8 MICROCYTOSIS: Primary | ICD-10-CM

## 2023-11-29 ENCOUNTER — LAB (OUTPATIENT)
Dept: LAB | Facility: CLINIC | Age: 28
End: 2023-11-29
Payer: COMMERCIAL

## 2023-11-29 DIAGNOSIS — R71.8 MICROCYTOSIS: ICD-10-CM

## 2023-11-29 LAB
ERYTHROCYTE [DISTWIDTH] IN BLOOD BY AUTOMATED COUNT: 18.2 % (ref 10–15)
FERRITIN SERPL-MCNC: 153 NG/ML (ref 31–409)
HCT VFR BLD AUTO: 40 % (ref 40–53)
HGB BLD-MCNC: 12.2 G/DL (ref 13.3–17.7)
IRON BINDING CAPACITY (ROCHE): 249 UG/DL (ref 240–430)
IRON SATN MFR SERPL: 51 % (ref 15–46)
IRON SERPL-MCNC: 128 UG/DL (ref 61–157)
MCH RBC QN AUTO: 18.9 PG (ref 26.5–33)
MCHC RBC AUTO-ENTMCNC: 30.5 G/DL (ref 31.5–36.5)
MCV RBC AUTO: 62 FL (ref 78–100)
PLATELET # BLD AUTO: 221 10E3/UL (ref 150–450)
RBC # BLD AUTO: 6.44 10E6/UL (ref 4.4–5.9)
RETICS # AUTO: 0.07 10E6/UL (ref 0.03–0.1)
RETICS/RBC NFR AUTO: 1 % (ref 0.5–2)
WBC # BLD AUTO: 5.4 10E3/UL (ref 4–11)

## 2023-11-29 PROCEDURE — 83540 ASSAY OF IRON: CPT

## 2023-11-29 PROCEDURE — 85045 AUTOMATED RETICULOCYTE COUNT: CPT

## 2023-11-29 PROCEDURE — 36415 COLL VENOUS BLD VENIPUNCTURE: CPT

## 2023-11-29 PROCEDURE — 83020 HEMOGLOBIN ELECTROPHORESIS: CPT | Mod: 90

## 2023-11-29 PROCEDURE — 83550 IRON BINDING TEST: CPT

## 2023-11-29 PROCEDURE — 85027 COMPLETE CBC AUTOMATED: CPT

## 2023-11-29 PROCEDURE — 82728 ASSAY OF FERRITIN: CPT

## 2023-11-29 PROCEDURE — 85660 RBC SICKLE CELL TEST: CPT | Mod: 90

## 2023-11-29 PROCEDURE — 99000 SPECIMEN HANDLING OFFICE-LAB: CPT

## 2023-11-29 PROCEDURE — 83021 HEMOGLOBIN CHROMOTOGRAPHY: CPT | Mod: 90

## 2023-12-01 LAB
HGB A1 MFR BLD: 94 %
HGB A2 MFR BLD: 5.3 %
HGB C MFR BLD: 0 %
HGB E MFR BLD: 0 %
HGB F MFR BLD: 0.7 %
HGB FRACT BLD ELPH-IMP: ABNORMAL
HGB OTHER MFR BLD: 0 %
HGB S BLD QL SOLY: ABNORMAL
HGB S MFR BLD: 0 %
PATH INTERP BLD-IMP: ABNORMAL

## 2024-07-04 ENCOUNTER — HOSPITAL ENCOUNTER (EMERGENCY)
Facility: CLINIC | Age: 29
Discharge: HOME | End: 2024-07-04
Attending: STUDENT IN AN ORGANIZED HEALTH CARE EDUCATION/TRAINING PROGRAM | Admitting: STUDENT IN AN ORGANIZED HEALTH CARE EDUCATION/TRAINING PROGRAM
Payer: COMMERCIAL

## 2024-07-04 ENCOUNTER — APPOINTMENT (OUTPATIENT)
Dept: ULTRASOUND IMAGING | Facility: CLINIC | Age: 29
End: 2024-07-04
Attending: STUDENT IN AN ORGANIZED HEALTH CARE EDUCATION/TRAINING PROGRAM
Payer: COMMERCIAL

## 2024-07-04 VITALS
HEART RATE: 59 BPM | RESPIRATION RATE: 16 BRPM | BODY MASS INDEX: 26.01 KG/M2 | OXYGEN SATURATION: 99 % | TEMPERATURE: 98.5 F | SYSTOLIC BLOOD PRESSURE: 115 MMHG | DIASTOLIC BLOOD PRESSURE: 68 MMHG | HEIGHT: 67 IN

## 2024-07-04 DIAGNOSIS — I86.1 LEFT VARICOCELE: ICD-10-CM

## 2024-07-04 PROCEDURE — 258N000003 HC RX IP 258 OP 636: Performed by: STUDENT IN AN ORGANIZED HEALTH CARE EDUCATION/TRAINING PROGRAM

## 2024-07-04 PROCEDURE — 76870 US EXAM SCROTUM: CPT

## 2024-07-04 PROCEDURE — 99284 EMERGENCY DEPT VISIT MOD MDM: CPT | Mod: 25

## 2024-07-04 PROCEDURE — 96360 HYDRATION IV INFUSION INIT: CPT

## 2024-07-04 PROCEDURE — 99284 EMERGENCY DEPT VISIT MOD MDM: CPT | Performed by: STUDENT IN AN ORGANIZED HEALTH CARE EDUCATION/TRAINING PROGRAM

## 2024-07-04 PROCEDURE — 93976 VASCULAR STUDY: CPT

## 2024-07-04 RX ORDER — ONDANSETRON 2 MG/ML
4 INJECTION INTRAMUSCULAR; INTRAVENOUS EVERY 30 MIN PRN
Status: DISCONTINUED | OUTPATIENT
Start: 2024-07-04 | End: 2024-07-04 | Stop reason: HOSPADM

## 2024-07-04 RX ADMIN — SODIUM CHLORIDE 1000 ML: 9 INJECTION, SOLUTION INTRAVENOUS at 15:50

## 2024-07-04 ASSESSMENT — COLUMBIA-SUICIDE SEVERITY RATING SCALE - C-SSRS
2. HAVE YOU ACTUALLY HAD ANY THOUGHTS OF KILLING YOURSELF IN THE PAST MONTH?: NO
6. HAVE YOU EVER DONE ANYTHING, STARTED TO DO ANYTHING, OR PREPARED TO DO ANYTHING TO END YOUR LIFE?: NO
1. IN THE PAST MONTH, HAVE YOU WISHED YOU WERE DEAD OR WISHED YOU COULD GO TO SLEEP AND NOT WAKE UP?: NO

## 2024-07-04 ASSESSMENT — ENCOUNTER SYMPTOMS
COUGH: 0
FEVER: 0
ABDOMINAL PAIN: 0
SHORTNESS OF BREATH: 0

## 2024-07-04 ASSESSMENT — ACTIVITIES OF DAILY LIVING (ADL): ADLS_ACUITY_SCORE: 35

## 2024-07-04 NOTE — ED TRIAGE NOTES
Pt reports this morning he started having tenderness in his left testicle that got worse. States the pain is only when he is walking or touches it. Pt states while at work today he was sitting down and passed out.      Triage Assessment (Adult)       Row Name 07/04/24 1522          Triage Assessment    Airway WDL WDL        Respiratory WDL    Respiratory WDL WDL        Skin Circulation/Temperature WDL    Skin Circulation/Temperature WDL WDL        Cardiac WDL    Cardiac WDL WDL        Peripheral/Neurovascular WDL    Peripheral Neurovascular WDL WDL        Cognitive/Neuro/Behavioral WDL    Cognitive/Neuro/Behavioral WDL WDL

## 2024-07-04 NOTE — ED PROVIDER NOTES
History     Chief Complaint   Patient presents with    Syncope    Testicular/scrotal Pain     HPI  Darell Collazo is a 29 year old male presenting with left testicle pain.  He notes he has been having this intermittently since he had his vasectomy various months ago.  He notes it started around 2 this afternoon and hurts when he is ambulating.  He has not had any urinary troubles abdominal pain or diarrhea.  He does note the pain intermittently extends from his testicle to his lower abdomen.    Allergies:  No Known Allergies    Problem List:    Patient Active Problem List    Diagnosis Date Noted    Acute appendicitis with perforation and localized peritonitis, without abscess, unspecified whether gangrene present 05/02/2023     Priority: Medium        Past Medical History:    Past Medical History:   Diagnosis Date    Acute appendicitis with perforation and gangrene 05/01/2023       Past Surgical History:    Past Surgical History:   Procedure Laterality Date    LAPAROSCOPIC APPENDECTOMY  05/01/2023    LAPAROSCOPIC APPENDECTOMY N/A 5/1/2023    Procedure: APPENDECTOMY and Partial Cecectomy, LAPAROSCOPIC;  Surgeon: Alan Marshall MD;  Location: [facility] OR    VASECTOMY N/A 09/10/2021       Family History:    Family History   Problem Relation Age of Onset    Heart Disease Father        Social History:  Marital Status:   [2]  Social History     Tobacco Use    Smoking status: Never    Smokeless tobacco: Never   Vaping Use    Vaping status: Never Used   Substance Use Topics    Alcohol use: Yes     Comment: rare    Drug use: Yes     Types: Marijuana        Medications:    No current outpatient medications on file.        Review of Systems   Constitutional:  Negative for fever.   Respiratory:  Negative for cough and shortness of breath.    Cardiovascular:  Negative for chest pain.   Gastrointestinal:  Negative for abdominal pain.   Genitourinary:         Left testicle discomfort   Skin:  Negative for rash.   All  "other systems reviewed and are negative.      Physical Exam   BP: 119/63  Pulse: 64  Temp: 98.5  F (36.9  C)  Resp: 20  Height: 170.2 cm (5' 7\")  SpO2: 100 %      Physical Exam  Vitals and nursing note reviewed.   Constitutional:       General: He is not in acute distress.     Appearance: Normal appearance. He is not toxic-appearing.   HENT:      Head: Normocephalic and atraumatic.   Eyes:      General: No scleral icterus.     Conjunctiva/sclera: Conjunctivae normal.   Cardiovascular:      Rate and Rhythm: Normal rate.      Heart sounds: Normal heart sounds.   Pulmonary:      Effort: Pulmonary effort is normal. No respiratory distress.      Breath sounds: Normal breath sounds.   Abdominal:      Palpations: Abdomen is soft.      Tenderness: There is no abdominal tenderness.   Genitourinary:     Penis: Normal.       Testes: Normal.      Comments: Tenderness to palpation of the epididymis of the left testicle.  No signs of rotation or horizontal lie.  No blue dot sign.  Cremasteric reflex intact.  Musculoskeletal:         General: No deformity. Normal range of motion.      Cervical back: Neck supple.   Skin:     General: Skin is warm.   Neurological:      Mental Status: He is alert.         ED Course        Procedures             Results for orders placed or performed during the hospital encounter of 07/04/24 (from the past 24 hour(s))   US Testicular & Scrotum w Doppler Ltd    Narrative    EXAM: US TESTICULAR AND SCROTUM WITH DOPPLER LIMITED  LOCATION: [facility]  DATE: 7/4/2024    INDICATION: severe left testicale pain  COMPARISON: None.  TECHNIQUE: Ultrasound of scrotum with color flow and spectral Doppler with waveform analysis performed.    FINDINGS:    RIGHT: Right testicle measures 4.6 x 2.5 x 2.4 cm. Normal testicle with no masses. Normal arterial duplex and normal color flow. Normal epididymis. No hydrocele. No varicocele.    LEFT: Left testicle measures 5.0 x 3.5 x 2.7 cm. " Normal testicle with no masses. Normal arterial duplex and normal color flow. Tiny 0.3 cm epididymal head cyst/spermatocele. No hydrocele. Dilated veins of the pampiniform plexus measuring up to 0.5 cm   with Valsalva maneuvers.      Impression    IMPRESSION:  Left varicocele. Otherwise normal ultrasound of the scrotum.       Medications   ondansetron (ZOFRAN) injection 4 mg (has no administration in time range)   sodium chloride 0.9% BOLUS 1,000 mL (1,000 mLs Intravenous $New Bag 7/4/24 1536)       Assessments & Plan (with Medical Decision Making)     I have reviewed the nursing notes.    I have reviewed the findings, diagnosis, plan and need for follow up with the patient.      Medical Decision Making  29-year-old male presenting with left testicle pain.  On examination no obvious signs of testicular torsion present.  Concern for orchitis versus epididymitis.  Unlikely orchitis secondary to no significant swelling identified at the testicle however tenderness to palpation of the posterior aspect of the left testicle.  No penile drainage identified or noted.  No other acute signs of fever or illness.  Ultrasound ordered and pending.  Imaging positive for varicocele.  Discussed finds with patient outpatient follow-up instructions and return precautions and patient discharged home.    New Prescriptions    No medications on file       Final diagnoses:   Left varicocele       7/4/2024   [facility] EMERGENCY DEPT       Rosy Van MD  07/04/24 [id number]       Rosy Van MD  07/04/24 [id number]

## 2024-07-04 NOTE — DISCHARGE INSTRUCTIONS
Please see instructions for more information regarding varicoceles.  Otherwise continue ibuprofen and Tylenol.  If any other concerns arise please follow-up with primary care doctor as outpatient follow-up with urology may be required.

## 2024-07-04 NOTE — LETTER
July 4, 2024      To Whom It May Concern:      Darell Collazo was seen in our Emergency Department today, 07/04/24.  I expect his condition to improve over the next 1 days.  He may return to work/school when improved.    Sincerely,        Rosy Van MD

## 2024-07-05 ENCOUNTER — PATIENT OUTREACH (OUTPATIENT)
Dept: FAMILY MEDICINE | Facility: CLINIC | Age: 29
End: 2024-07-05
Payer: COMMERCIAL

## 2024-07-05 NOTE — TELEPHONE ENCOUNTER
Patient Contact    Attempt # 1    RN did attempt to reach Patient. No answer. Message left for Patient to call the clinic back and ask to speak to a triage nurse.     Anastasiya Humphries RN on 7/5/2024 at 3:21 PM

## 2024-07-08 NOTE — TELEPHONE ENCOUNTER
Transitions of Care Outreach  Chief Complaint   Patient presents with    Hospital F/U       Most Recent Admission Date: 7/4/2024   Most Recent Admission Diagnosis:      Most Recent Discharge Date: 7/4/2024   Most Recent Discharge Diagnosis: Left varicocele - I86.1     Transitions of Care Assessment    Discharge Assessment  How are you doing now that you are home?: good  How are your symptoms? (Red Flag symptoms escalate to triage hotline per guidelines): Improved  Do you know how to contact your clinic care team if you have future questions or changes to your health status? : Yes  Does the patient have their discharge instructions? : Yes  Does the patient have questions regarding their discharge instructions? : No  Were you started on any new medications or were there changes to any of your previous medications? : No  Does the patient have all of their medications?: Yes  Do you have questions regarding any of your medications? : No  Do you have all of your needed medical supplies or equipment (DME)?  (i.e. oxygen tank, CPAP, cane, etc.): Yes    Follow up Plan     Discharge Follow-Up  Discharge follow up appointment scheduled in alignment with recommended follow up timeframe or Transitions of Risk Category? (Low = within 30 days; Moderate= within 14 days; High= within 7 days): No  Patient's follow up appointment not scheduled: Patient declined scheduling support. Education on the importance of transitions of care follow up. Provided scheduling phone number.    No future appointments.    Outpatient Plan as outlined on AVS reviewed with patient.    For any urgent concerns, please contact our 24 hour nurse triage line: [phone] ([phone])       Judy Tinoco RN

## 2024-08-24 NOTE — PROGRESS NOTES
Prior to immunization administration, verified patients identity using patient s name and date of birth. Please see Immunization Activity for additional information.     Screening Questionnaire for Adult Immunization    Are you sick today?   No   Do you have allergies to medications, food, a vaccine component or latex?   No   Have you ever had a serious reaction after receiving a vaccination?   No   Do you have a long-term health problem with heart, lung, kidney, or metabolic disease (e.g., diabetes), asthma, a blood disorder, no spleen, complement component deficiency, a cochlear implant, or a spinal fluid leak?  Are you on long-term aspirin therapy?   No   Do you have cancer, leukemia, HIV/AIDS, or any other immune system problem?   No   Do you have a parent, brother, or sister with an immune system problem?   No   In the past 3 months, have you taken medications that affect  your immune system, such as prednisone, other steroids, or anticancer drugs; drugs for the treatment of rheumatoid arthritis, Crohn s disease, or psoriasis; or have you had radiation treatments?   No   Have you had a seizure, or a brain or other nervous system problem?   No   During the past year, have you received a transfusion of blood or blood    products, or been given immune (gamma) globulin or antiviral drug?   No   For women: Are you pregnant or is there a chance you could become       pregnant during the next month?   No   Have you received any vaccinations in the past 4 weeks?   No     Immunization questionnaire answers were all negative.        Per orders of Dr. Taveras, injection of Hep B, MMR, Varicella given by Carissa Barrios CMA. Patient instructed to remain in clinic for 15 minutes afterwards, and to report any adverse reaction to me immediately.       Screening performed by Carissa Barrios CMA on 1/27/2020 at 8:48 AM.     4 = No assist / stand by assistance

## 2024-10-04 ENCOUNTER — PATIENT OUTREACH (OUTPATIENT)
Dept: CARE COORDINATION | Facility: CLINIC | Age: 29
End: 2024-10-04
Payer: COMMERCIAL

## 2024-10-18 ENCOUNTER — PATIENT OUTREACH (OUTPATIENT)
Dept: CARE COORDINATION | Facility: CLINIC | Age: 29
End: 2024-10-18
Payer: COMMERCIAL

## 2024-12-18 ENCOUNTER — PATIENT OUTREACH (OUTPATIENT)
Dept: CARE COORDINATION | Facility: CLINIC | Age: 29
End: 2024-12-18
Payer: COMMERCIAL

## 2024-12-30 ENCOUNTER — OFFICE VISIT (OUTPATIENT)
Dept: FAMILY MEDICINE | Facility: CLINIC | Age: 29
End: 2024-12-30
Payer: COMMERCIAL

## 2024-12-30 VITALS
DIASTOLIC BLOOD PRESSURE: 78 MMHG | RESPIRATION RATE: 12 BRPM | HEART RATE: 58 BPM | OXYGEN SATURATION: 98 % | WEIGHT: 167 LBS | HEIGHT: 67 IN | BODY MASS INDEX: 26.21 KG/M2 | TEMPERATURE: 97.9 F | SYSTOLIC BLOOD PRESSURE: 119 MMHG

## 2024-12-30 DIAGNOSIS — Z00.00 ROUTINE GENERAL MEDICAL EXAMINATION AT A HEALTH CARE FACILITY: Primary | ICD-10-CM

## 2024-12-30 DIAGNOSIS — Z23 NEED FOR PROPHYLACTIC VACCINATION AND INOCULATION AGAINST INFLUENZA: ICD-10-CM

## 2024-12-30 PROCEDURE — 90632 HEPA VACCINE ADULT IM: CPT | Performed by: FAMILY MEDICINE

## 2024-12-30 PROCEDURE — 90656 IIV3 VACC NO PRSV 0.5 ML IM: CPT | Performed by: FAMILY MEDICINE

## 2024-12-30 PROCEDURE — 90746 HEPB VACCINE 3 DOSE ADULT IM: CPT | Performed by: FAMILY MEDICINE

## 2024-12-30 PROCEDURE — 99395 PREV VISIT EST AGE 18-39: CPT | Mod: 25 | Performed by: FAMILY MEDICINE

## 2024-12-30 PROCEDURE — 90471 IMMUNIZATION ADMIN: CPT | Performed by: FAMILY MEDICINE

## 2024-12-30 PROCEDURE — 90472 IMMUNIZATION ADMIN EACH ADD: CPT | Performed by: FAMILY MEDICINE

## 2024-12-30 SDOH — HEALTH STABILITY: PHYSICAL HEALTH: ON AVERAGE, HOW MANY DAYS PER WEEK DO YOU ENGAGE IN MODERATE TO STRENUOUS EXERCISE (LIKE A BRISK WALK)?: 4 DAYS

## 2024-12-30 ASSESSMENT — SOCIAL DETERMINANTS OF HEALTH (SDOH): HOW OFTEN DO YOU GET TOGETHER WITH FRIENDS OR RELATIVES?: TWICE A WEEK

## 2024-12-30 ASSESSMENT — PAIN SCALES - GENERAL: PAINLEVEL_OUTOF10: NO PAIN (0)

## 2024-12-30 NOTE — PROGRESS NOTES
Prior to immunization administration, verified patients identity using patient s name and date of birth. Please see Immunization Activity for additional information.     Screening Questionnaire for Adult Immunization    Are you sick today?   No   Do you have allergies to medications, food, a vaccine component or latex?   No   Have you ever had a serious reaction after receiving a vaccination?   No   Do you have a long-term health problem with heart, lung, kidney, or metabolic disease (e.g., diabetes), asthma, a blood disorder, no spleen, complement component deficiency, a cochlear implant, or a spinal fluid leak?  Are you on long-term aspirin therapy?   No   Do you have cancer, leukemia, HIV/AIDS, or any other immune system problem?   No   Do you have a parent, brother, or sister with an immune system problem?   No   In the past 3 months, have you taken medications that affect  your immune system, such as prednisone, other steroids, or anticancer drugs; drugs for the treatment of rheumatoid arthritis, Crohn s disease, or psoriasis; or have you had radiation treatments?   No   Have you had a seizure, or a brain or other nervous system problem?   No   During the past year, have you received a transfusion of blood or blood    products, or been given immune (gamma) globulin or antiviral drug?   No   For women: Are you pregnant or is there a chance you could become       pregnant during the next month?   No   Have you received any vaccinations in the past 4 weeks?   No     Immunization questionnaire answers were all negative.      Patient instructed to remain in clinic for 15 minutes afterwards, and to report any adverse reactions.     Screening performed by Danni Browning MA on 12/30/2024 at 10:48 AM.

## 2024-12-30 NOTE — PATIENT INSTRUCTIONS
Increase fiber and water intake.  I recommend once to twice daily fiber supplementation with psyllium fiber (metamucil) along with 60-80 ounces of water or non caffeine fluid daily.  If you choose to use fiber capsules, the dose equivalent is 3 capsules for one teaspoon of powder.     1.  Saline sinus rinse (one form comes in a squirt bottle form while another kind is known as a Neti Pots).  Follow directions on package.  May do this 1-2 times per day.  2.  May also use Afrin (oxymetazoline) nasal spray for a maximum of 3 days for symptom control.  Do not use for longer than this.  A good idea is to use this medication with saline nasal irrigation.  If you choose to do this, use the Afrin about 30-45 minutes after the sinus rinse to maximize effect of medication.  Short term use only.  3.  Flonase (fluticasone):  use as directed on package or prescription.  A good idea is to use this medication with saline nasal irrigation.  If you choose to do this, use the fluticasone about 30-45 minutes after the sinus rinse to maximize effect of medication.    4.  Sudafed (psudoephedrine) per package directions.  This is the medication that has to be obtained from behind the pharmacist's counter.  Short term use only.  5.  Antihistamines:  Daytime:  Zyrtec (cetirizine).  10 mg once to twice daily.  6.  Tylenol (acetaminophen) or Advil (ibuprofen) as needed for pain and/or fever.     Patient Education   Preventive Care Advice   This is general advice given by our system to help you stay healthy. However, your care team may have specific advice just for you. Please talk to your care team about your preventive care needs.  Nutrition  Eat 5 or more servings of fruits and vegetables each day.  Try wheat bread, brown rice and whole grain pasta (instead of white bread, rice, and pasta).  Get enough calcium and vitamin D. Check the label on foods and aim for 100% of the RDA (recommended daily allowance).  Lifestyle  Exercise at least  150 minutes each week  (30 minutes a day, 5 days a week).  Do muscle strengthening activities 2 days a week. These help control your weight and prevent disease.  No smoking.  Wear sunscreen to prevent skin cancer.  Have a dental exam and cleaning every 6 months.  Yearly exams  See your health care team every year to talk about:  Any changes in your health.  Any medicines your care team has prescribed.  Preventive care, family planning, and ways to prevent chronic diseases.  Shots (vaccines)   HPV shots (up to age 26), if you've never had them before.  Hepatitis B shots (up to age 59), if you've never had them before.  COVID-19 shot: Get this shot when it's due.  Flu shot: Get a flu shot every year.  Tetanus shot: Get a tetanus shot every 10 years.  Pneumococcal, hepatitis A, and RSV shots: Ask your care team if you need these based on your risk.  Shingles shot (for age 50 and up)  General health tests  Diabetes screening:  Starting at age 35, Get screened for diabetes at least every 3 years.  If you are younger than age 35, ask your care team if you should be screened for diabetes.  Cholesterol test: At age 39, start having a cholesterol test every 5 years, or more often if advised.  Bone density scan (DEXA): At age 50, ask your care team if you should have this scan for osteoporosis (brittle bones).  Hepatitis C: Get tested at least once in your life.  STIs (sexually transmitted infections)  Before age 24: Ask your care team if you should be screened for STIs.  After age 24: Get screened for STIs if you're at risk. You are at risk for STIs (including HIV) if:  You are sexually active with more than one person.  You don't use condoms every time.  You or a partner was diagnosed with a sexually transmitted infection.  If you are at risk for HIV, ask about PrEP medicine to prevent HIV.  Get tested for HIV at least once in your life, whether you are at risk for HIV or not.  Cancer screening tests  Cervical cancer  screening: If you have a cervix, begin getting regular cervical cancer screening tests starting at age 21.  Breast cancer scan (mammogram): If you've ever had breasts, begin having regular mammograms starting at age 40. This is a scan to check for breast cancer.  Colon cancer screening: It is important to start screening for colon cancer at age 45.  Have a colonoscopy test every 10 years (or more often if you're at risk) Or, ask your provider about stool tests like a FIT test every year or Cologuard test every 3 years.  To learn more about your testing options, visit:   .  For help making a decision, visit:   https://bit.ly/zs98822.  Prostate cancer screening test: If you have a prostate, ask your care team if a prostate cancer screening test (PSA) at age 55 is right for you.  Lung cancer screening: If you are a current or former smoker ages 50 to 80, ask your care team if ongoing lung cancer screenings are right for you.  For informational purposes only. Not to replace the advice of your health care provider. Copyright   2023 [facility] [employer]. All rights reserved. Clinically reviewed by the [facility] Transitions Program. [employer] 235172 - REV 01/24.  Substance Use Disorder: Care Instructions  Overview     You can improve your life and health by stopping your use of alcohol or drugs. When you don't drink or use drugs, you may feel and sleep better. You may get along better with your family, friends, and coworkers. There are medicines and programs that can help with substance use disorder.  How can you care for yourself at home?  Here are some ways to help you stay sober and prevent relapse.  If you have been given medicine to help keep you sober or reduce your cravings, be sure to take it exactly as prescribed.  Talk to your doctor about programs that can help you stop using drugs or drinking alcohol.  Do not keep alcohol or drugs in your home.  Plan ahead. Think about what you'll say if other  people ask you to drink or use drugs. Try not to spend time with people who drink or use drugs.  Use the time and money spent on drinking or drugs to do something that's important to you.  Preventing a relapse  Have a plan to deal with relapse. Learn to recognize changes in your thinking that lead you to drink or use drugs. Get help before you start to drink or use drugs again.  Try to stay away from situations, friends, or places that may lead you to drink or use drugs.  If you feel the need to drink alcohol or use drugs again, seek help right away. Call a trusted friend or family member. Some people get support from organizations such as Narcotics Anonymous or [employer] or from treatment facilities.  If you relapse, get help as soon as you can. Some people make a plan with another person that outlines what they want that person to do for them if they relapse. The plan usually includes how to handle the relapse and who to notify in case of relapse.  Don't give up. Remember that a relapse doesn't mean that you have failed. Use the experience to learn the triggers that lead you to drink or use drugs. Then quit again. Recovery is a lifelong process. Many people have several relapses before they are able to quit for good.  Follow-up care is a key part of your treatment and safety. Be sure to make and go to all appointments, and call your doctor if you are having problems. It's also a good idea to know your test results and keep a list of the medicines you take.  When should you call for help?   Call 911  anytime you think you may need emergency care. For example, call if you or someone else:    Has overdosed or has withdrawal signs. Be sure to tell the emergency workers that you are or someone else is using or trying to quit using drugs. Overdose or withdrawal signs may include:  Losing consciousness.  Seizure.  Seeing or hearing things that aren't there (hallucinations).     Is thinking or talking about suicide  "or harming others.   Where to get help 24 hours a day, 7 days a week   If you or someone you know talks about suicide, self-harm, a mental health crisis, a substance use crisis, or any other kind of emotional distress, get help right away. You can:    Call the Suicide and Crisis Lifeline at 988.     Call 6-982-043-TALK (1-820.553.9976).     Text HOME to 731017 to access the Crisis Text Line.   Consider saving these numbers in your phone.  Go to [employer] for more information or to chat online.  Call your doctor now or seek immediate medical care if:    You are having withdrawal symptoms. These may include nausea or vomiting, sweating, shakiness, and anxiety.   Watch closely for changes in your health, and be sure to contact your doctor if:    You have a relapse.     You need more help or support to stop.   Where can you learn more?  Go to https://www.[employer].net/patiented  Enter H573 in the search box to learn more about \"Substance Use Disorder: Care Instructions.\"  Current as of: November 15, 2023  Content Version: 14.3    2024 [employer].   Care instructions adapted under license by your healthcare professional. If you have questions about a medical condition or this instruction, always ask your healthcare professional. [employer] disclaims any warranty or liability for your use of this information.       "

## 2024-12-30 NOTE — PROGRESS NOTES
"Preventive Care Visit  [facility]  Gal Taveras MD, Family Medicine  Dec 30, 2024      Assessment & Plan     ASSESSMENT/ORDERS:    ICD-10-CM    1. Routine general medical examination at a health care facility  Z00.00       2. Need for prophylactic vaccination and inoculation against influenza  Z23                   BMI  Estimated body mass index is 26.03 kg/m  as calculated from the following:    Height as of this encounter: 1.706 m (5' 7.17\").    Weight as of this encounter: 75.8 kg (167 lb).   Weight management plan: Discussed healthy diet and exercise guidelines    Counseling  Appropriate preventive services were addressed with this patient via screening, questionnaire, or discussion as appropriate for fall prevention, nutrition, physical activity, Tobacco-use cessation, social engagement, weight loss and cognition.  Checklist reviewing preventive services available has been given to the patient.  Reviewed patient's diet, addressing concerns and/or questions.           Dunia Roberson is a 29 year old, presenting for the following:  Physical and Imm/Inj (Flu Shot)        12/30/2024     9:25 AM   Additional Questions   Roomed by Marilou         12/30/2024     9:25 AM   Patient Reported Additional Medications   Patient reports taking the following new medications none          HPI          Health Care Directive  Patient does not have a Health Care Directive: Discussed advance care planning with patient; information given to patient to review.      12/30/2024   General Health   How would you rate your overall physical health? Excellent   Feel stress (tense, anxious, or unable to sleep) Only a little   (!) STRESS CONCERN      12/30/2024   Nutrition   Three or more servings of calcium each day? Yes   Diet: Regular (no restrictions)   How many servings of fruit and vegetables per day? (!) 2-3   How many sweetened beverages each day? 0-1         12/30/2024   Exercise   Days per " "week of moderate/strenous exercise 4 days         12/30/2024   Social Factors   Frequency of gathering with friends or relatives Twice a week   Worry food won't last until get money to buy more No   Food not last or not have enough money for food? No   Do you have housing? (Housing is defined as stable permanent housing and does not include staying ouside in a car, in a tent, in an abandoned building, in an overnight shelter, or couch-surfing.) Yes   Are you worried about losing your housing? No   Lack of transportation? No   Unable to get utilities (heat,electricity)? No         12/30/2024   Dental   Dentist two times every year? Yes         12/30/2024   TB Screening   Were you born outside of the [place]? No         Today's PHQ-2 Score:       12/30/2024     9:27 AM   PHQ-2 ( 1999 [employer])   Q1: Little interest or pleasure in doing things 0   Q2: Feeling down, depressed or hopeless 0   PHQ-2 Score 0    Q1: Little interest or pleasure in doing things Not at all   Q2: Feeling down, depressed or hopeless Not at all   PHQ-2 Score 0       Patient-reported           12/30/2024   Substance Use   Alcohol more than 3/day or more than 7/wk No   Do you use any other substances recreationally? (!) CANNABIS PRODUCTS     Social History     Tobacco Use    Smoking status: Never    Smokeless tobacco: Never   Vaping Use    Vaping status: Some Days    Substances: THC, Flavoring    Devices: Disposable   Substance Use Topics    Alcohol use: Yes     Comment: Once a month or less on average.    Drug use: Yes     Types: Marijuana           12/30/2024   STI Screening   New sexual partner(s) since last STI/HIV test? No        Reviewed and updated as needed this visit by Provider                             Objective    Exam  /78 (BP Location: Right arm, Patient Position: Sitting, Cuff Size: Adult Large)   Pulse 58   Temp 97.9  F (36.6  C) (Temporal)   Resp 12   Ht 1.706 m (5' 7.17\")   Wt 75.8 kg (167 lb)   SpO2 98%   BMI 26.03 kg/m   " "  Estimated body mass index is 26.03 kg/m  as calculated from the following:    Height as of this encounter: 1.706 m (5' 7.17\").    Weight as of this encounter: 75.8 kg (167 lb).    Physical Exam  Constitutional:       General: He is not in acute distress.     Appearance: He is well-developed.   HENT:      Head: Normocephalic and atraumatic.      Right Ear: Hearing, tympanic membrane, ear canal and external ear normal.      Left Ear: Hearing, tympanic membrane, ear canal and external ear normal.      Nose: Nose normal.      Mouth/Throat:      Mouth: No oral lesions.      Pharynx: Uvula midline. No oropharyngeal exudate.   Eyes:      General: Lids are normal. No scleral icterus.        Right eye: No discharge.         Left eye: No discharge.      Extraocular Movements: Extraocular movements intact.      Conjunctiva/sclera: Conjunctivae normal.      Pupils: Pupils are equal, round, and reactive to light.   Neck:      Thyroid: No thyroid mass or thyromegaly.      Trachea: No tracheal deviation.   Cardiovascular:      Rate and Rhythm: Normal rate and regular rhythm.      Pulses: Normal pulses.      Heart sounds: Normal heart sounds, S1 normal and S2 normal. No murmur heard.     No S3 or S4 sounds.   Pulmonary:      Effort: Pulmonary effort is normal. No respiratory distress.      Breath sounds: Normal breath sounds. No wheezing or rales.   Abdominal:      General: Bowel sounds are normal. There is no distension.      Palpations: Abdomen is soft. There is no mass.      Tenderness: There is no abdominal tenderness. There is no guarding.   Musculoskeletal:         General: No deformity. Normal range of motion.      Cervical back: Normal range of motion and neck supple.   Lymphadenopathy:      Cervical: No cervical adenopathy.      Upper Body:      Right upper body: No supraclavicular adenopathy.      Left upper body: No supraclavicular adenopathy.   Skin:     General: Skin is warm and dry.      Findings: No lesion or rash. "   Neurological:      Mental Status: He is alert and oriented to person, place, and time.      Motor: No abnormal muscle tone.      Deep Tendon Reflexes: Reflexes are normal and symmetric.   Psychiatric:         Speech: Speech normal.         Thought Content: Thought content normal.         Judgment: Judgment normal.               Signed Electronically by: Gal Taveras MD

## (undated) DEVICE — ENDO TROCAR SLEEVE KII Z-THREADED 05X100MM CTS02

## (undated) DEVICE — PACK GENERAL LAPAOSCOPY

## (undated) DEVICE — ESU LIGASURE LAPAROSCOPIC BLUNT TIP SEALER 5MMX37CM LF1837

## (undated) DEVICE — STPL POWERED ECHELON 45MM PSEE45A

## (undated) DEVICE — ENDO TROCAR BLUNT TIP KII BALLOON 12X100MM C0R47

## (undated) DEVICE — ADH SKIN CLOSURE PREMIERPRO EXOFIN 1.0ML 3470

## (undated) DEVICE — STPL RELOAD REG TISSUE ECHELON 45 X 3.6MM BLUE GST45B

## (undated) DEVICE — ENDO POUCH UNIV RETRIEVAL SYSTEM INZII 10MM CD001

## (undated) DEVICE — ENDO TROCAR FIRST ENTRY KII FIOS Z-THRD 05X100MM CTF03

## (undated) DEVICE — SU VICRYL 0 UR-6 27" J603H

## (undated) DEVICE — STOCKING SLEEVE COMPRESSION CALF MED

## (undated) DEVICE — Device

## (undated) DEVICE — SU MONOCRYL 4-0 PS-2 18" UND Y496G

## (undated) DEVICE — GLOVE BIOGEL PI ULTRATOUCH G SZ 7.5 42175

## (undated) DEVICE — ESU GROUND PAD UNIVERSAL W/O CORD

## (undated) DEVICE — ESU PENCIL W/HOLSTER

## (undated) DEVICE — SUCTION IRR STRYKERFLOW II W/TIP 250-070-520

## (undated) DEVICE — SOL NACL 0.9% INJ 1000ML BAG 07983-09

## (undated) DEVICE — BLADE CLIPPER 4406

## (undated) DEVICE — GLOVE BIOGEL INDICATOR 7.5 LF 41675

## (undated) RX ORDER — KETOROLAC TROMETHAMINE 30 MG/ML
INJECTION, SOLUTION INTRAMUSCULAR; INTRAVENOUS
Status: DISPENSED
Start: 2023-05-01

## (undated) RX ORDER — ONDANSETRON 2 MG/ML
INJECTION INTRAMUSCULAR; INTRAVENOUS
Status: DISPENSED
Start: 2023-05-01

## (undated) RX ORDER — DEXMEDETOMIDINE HYDROCHLORIDE 100 UG/ML
INJECTION, SOLUTION INTRAVENOUS
Status: DISPENSED
Start: 2023-05-01

## (undated) RX ORDER — BUPIVACAINE HYDROCHLORIDE AND EPINEPHRINE 2.5; 5 MG/ML; UG/ML
INJECTION, SOLUTION EPIDURAL; INFILTRATION; INTRACAUDAL; PERINEURAL
Status: DISPENSED
Start: 2023-05-01

## (undated) RX ORDER — PROPOFOL 10 MG/ML
INJECTION, EMULSION INTRAVENOUS
Status: DISPENSED
Start: 2023-05-01

## (undated) RX ORDER — LIDOCAINE HYDROCHLORIDE 10 MG/ML
INJECTION, SOLUTION EPIDURAL; INFILTRATION; INTRACAUDAL; PERINEURAL
Status: DISPENSED
Start: 2023-05-01

## (undated) RX ORDER — DEXAMETHASONE SODIUM PHOSPHATE 10 MG/ML
INJECTION, SOLUTION INTRAMUSCULAR; INTRAVENOUS
Status: DISPENSED
Start: 2023-05-01

## (undated) RX ORDER — FENTANYL CITRATE 50 UG/ML
INJECTION, SOLUTION INTRAMUSCULAR; INTRAVENOUS
Status: DISPENSED
Start: 2023-05-01